# Patient Record
Sex: MALE | Race: WHITE | Employment: PART TIME | URBAN - METROPOLITAN AREA
[De-identification: names, ages, dates, MRNs, and addresses within clinical notes are randomized per-mention and may not be internally consistent; named-entity substitution may affect disease eponyms.]

---

## 2018-08-29 ENCOUNTER — OFFICE VISIT (OUTPATIENT)
Dept: ENT CLINIC | Age: 58
End: 2018-08-29
Payer: COMMERCIAL

## 2018-08-29 VITALS
BODY MASS INDEX: 21.19 KG/M2 | WEIGHT: 135 LBS | SYSTOLIC BLOOD PRESSURE: 125 MMHG | DIASTOLIC BLOOD PRESSURE: 87 MMHG | HEART RATE: 66 BPM | OXYGEN SATURATION: 97 % | HEIGHT: 67 IN

## 2018-08-29 DIAGNOSIS — H61.23 BILATERAL IMPACTED CERUMEN: Primary | ICD-10-CM

## 2018-08-29 DIAGNOSIS — H91.93 BILATERAL HEARING LOSS, UNSPECIFIED HEARING LOSS TYPE: ICD-10-CM

## 2018-08-29 PROCEDURE — 3017F COLORECTAL CA SCREEN DOC REV: CPT | Performed by: OTOLARYNGOLOGY

## 2018-08-29 PROCEDURE — 99204 OFFICE O/P NEW MOD 45 MIN: CPT | Performed by: OTOLARYNGOLOGY

## 2018-08-29 PROCEDURE — 1036F TOBACCO NON-USER: CPT | Performed by: OTOLARYNGOLOGY

## 2018-08-29 PROCEDURE — G8420 CALC BMI NORM PARAMETERS: HCPCS | Performed by: OTOLARYNGOLOGY

## 2018-08-29 PROCEDURE — G8427 DOCREV CUR MEDS BY ELIG CLIN: HCPCS | Performed by: OTOLARYNGOLOGY

## 2018-08-29 ASSESSMENT — ENCOUNTER SYMPTOMS
SINUS PRESSURE: 0
SORE THROAT: 0
RHINORRHEA: 0
SINUS PAIN: 0

## 2018-08-29 NOTE — PROGRESS NOTES
Smokeless tobacco: Never Used    Alcohol use No    Drug use: No    Sexual activity: No     Other Topics Concern    Not on file     Social History Narrative    No narrative on file       REVIEW OF SYSTEMS:                                                                                                       Review of Systems   HENT: Positive for hearing loss. Negative for congestion, drooling, ear discharge, ear pain, nosebleeds, postnasal drip, rhinorrhea, sinus pain, sinus pressure, sneezing, sore throat and tinnitus. Allergic/Immunologic: Negative for environmental allergies and food allergies. Constitutional: Negative for chills and fever. Eyes: Negative for discharge and itching. Respiratory: Negative for chest tightness and shortness of breath. Cardiovascular: Negative for chest pain and palpitations. Gastrointestinal: Negative for abdominal distention and abdominal pain. Endocrinology: Negative for heat and cold intolerance. Neurological: Negative for focal weakness or seizure   Skin: Negative for rash and itchiness   Psychiatric: Negative for depression and hallucinations     PHYSICAL EXAM:                                                                                                                /87   Pulse 66   Ht 5' 7\" (1.702 m)   Wt 135 lb (61.2 kg)   SpO2 97%   BMI 21.14 kg/m²   Physical Exam  Constitutional: Appears well-developed and well-nourished. Appears as stated age.    Eyes: Lids and lashes normal, pupils equal, extra ocular muscles intact, sclera clear   ENT: normocepalic, without obvious abnormality, atraumatic, sinuses nontender on palpation, significant cerumen impaction bilaterally, bilateral tympanic membrane(s) not visualized secondary cerumen, Nares normal, septum midline, no lesions, no drainage, lips normal, good dentition, gums normal, oral pharynx with moist mucus membranes, no lesions tongue, tonsils without erythema or exudates, palate within normal limits, normal voice   Neck:  Supple, symmetrical, trachea midline, no adenopathy, thyroid symmetric, not enlarged and no tenderness, skin normal   Respiratory: No increased work of breathing. No stridor or wheezes   Cardiovascular: Regular rate   Skin: Warm and dry   Neurologic:  Alert and oriented     Cerumen removal     Auditory canal(s) both ears completely obstructed with cerumen. A microscope was used due to deep impaction of the cerumen. Cerumen was gently removed using soft plastic curette, suction. Significant impaction was unable to be removed near TM bilaterally due to patient tolerance. Will give drops to loosen imaction and follow-up for completion of cerumen cleaning. ASSESSMENT AND PLAN:                                                                                                  Diagnosis Orders   1. Bilateral impacted cerumen     2. Bilateral hearing loss, unspecified hearing loss type         59 y.o. male with history of hearing loss bilaterally presents with cerumen impaction bilaterally    · Cerumen removal per above  · Home drops   · Follow as scheduled for completion of cerumen removal or sooner if symptoms acutely exacerbate. Will evaluation hearing at that time. Dr. Talya Woods.  Otolaryngology Facial Plastic Surgery  : [facility] Otolaryngology/Facial Plastic Surgery Residency    Associate Clinical Professor: SHRADDHA Nguyen  [facility]

## 2018-09-07 ENCOUNTER — OFFICE VISIT (OUTPATIENT)
Dept: ENT CLINIC | Age: 58
End: 2018-09-07
Payer: COMMERCIAL

## 2018-09-07 VITALS
HEIGHT: 67 IN | HEART RATE: 55 BPM | BODY MASS INDEX: 21.35 KG/M2 | DIASTOLIC BLOOD PRESSURE: 82 MMHG | WEIGHT: 136 LBS | SYSTOLIC BLOOD PRESSURE: 146 MMHG

## 2018-09-07 DIAGNOSIS — H61.23 BILATERAL HEARING LOSS DUE TO CERUMEN IMPACTION: Primary | ICD-10-CM

## 2018-09-07 PROCEDURE — 69210 REMOVE IMPACTED EAR WAX UNI: CPT | Performed by: OTOLARYNGOLOGY

## 2018-09-23 ASSESSMENT — ENCOUNTER SYMPTOMS
BLURRED VISION: 0
HEARTBURN: 0
VOMITING: 0
DOUBLE VISION: 0
COUGH: 0
SHORTNESS OF BREATH: 0

## 2018-09-23 NOTE — PROGRESS NOTES
dizziness, tingling and headaches. Endo/Heme/Allergies: Negative for environmental allergies. Does not bruise/bleed easily. All other systems reviewed and are negative. Procedure: Cerumen Impaction    Pre-op: Cerumen Impaction    Post Op: Same    Procedure: Cerumen Impaction removal    Surgeon: Emily Contreras    Procedure: Under microscopic assistance large cerumen impaction was removed using gentle suction and curette. TM intact B/L, Pt tolerated procedure well    Complications: None    Blood Loss Minimial        BP (!) 146/82 (Site: Right Upper Arm, Position: Sitting, Cuff Size: Medium Adult)   Pulse 55   Ht 5' 7\" (1.702 m)   Wt 136 lb (61.7 kg)   BMI 21.30 kg/m²   Physical Exam   Constitutional: He appears well-developed and well-nourished. HENT:   Ears:    Eyes: Pupils are equal, round, and reactive to light. EOM are normal.   Neck: Normal range of motion. No tracheal deviation present. No thyromegaly present. Cardiovascular: Normal rate and intact distal pulses. Pulmonary/Chest: Effort normal. No respiratory distress. Musculoskeletal: Normal range of motion. He exhibits no edema. Lymphadenopathy:     He has no cervical adenopathy. Neurological: He is alert. No cranial nerve deficit. Skin: Skin is warm. No erythema. Nursing note and vitals reviewed. IMPRESSION/PLAN:  Patient underwent cerumen partial removal bilaterally with microscopic assistance required instrumentation including curet and suction. Patient tolerated procedure well, here for baseline per the patient once completed, follow-up in 6 months or sooner with complaints. Dr. Sheila Palacio.  Otolaryngology Facial Plastic Surgery  :  [facility] Otolaryngology/Facial Plastic Surgery Residency  Associate Clinical Professor:  Luis Larios, [facility]

## 2018-10-03 ENCOUNTER — HOSPITAL ENCOUNTER (EMERGENCY)
Age: 58
Discharge: HOME | End: 2018-10-03
Payer: COMMERCIAL

## 2018-10-03 VITALS
DIASTOLIC BLOOD PRESSURE: 66 MMHG | HEART RATE: 64 BPM | SYSTOLIC BLOOD PRESSURE: 148 MMHG | BODY MASS INDEX: 21.68 KG/M2 | TEMPERATURE: 97.8 F | WEIGHT: 138.13 LBS | HEIGHT: 67 IN | OXYGEN SATURATION: 98 % | RESPIRATION RATE: 14 BRPM

## 2018-10-03 DIAGNOSIS — M25.562 CHRONIC PAIN OF LEFT KNEE: Primary | ICD-10-CM

## 2018-10-03 DIAGNOSIS — G89.29 CHRONIC PAIN OF LEFT KNEE: Primary | ICD-10-CM

## 2018-10-03 PROCEDURE — 99283 EMERGENCY DEPT VISIT LOW MDM: CPT

## 2018-10-03 RX ORDER — MELOXICAM 7.5 MG/1
7.5 TABLET ORAL DAILY
Qty: 10 TABLET | Refills: 0 | Status: SHIPPED | OUTPATIENT
Start: 2018-10-03 | End: 2018-10-22 | Stop reason: ALTCHOICE

## 2018-10-03 ASSESSMENT — PAIN DESCRIPTION - ORIENTATION: ORIENTATION: LEFT

## 2018-10-03 ASSESSMENT — PAIN DESCRIPTION - PAIN TYPE: TYPE: ACUTE PAIN

## 2018-10-03 ASSESSMENT — PAIN DESCRIPTION - LOCATION: LOCATION: KNEE

## 2018-10-03 ASSESSMENT — PAIN DESCRIPTION - DESCRIPTORS: DESCRIPTORS: ACHING;DISCOMFORT

## 2018-10-03 ASSESSMENT — PAIN SCALES - GENERAL: PAINLEVEL_OUTOF10: 8
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
## 2018-10-22 ENCOUNTER — OFFICE VISIT (OUTPATIENT)
Dept: FAMILY MEDICINE CLINIC | Age: 58
End: 2018-10-22
Payer: COMMERCIAL

## 2018-10-22 VITALS
OXYGEN SATURATION: 97 % | TEMPERATURE: 97.9 F | HEIGHT: 67 IN | WEIGHT: 139.8 LBS | HEART RATE: 68 BPM | DIASTOLIC BLOOD PRESSURE: 83 MMHG | RESPIRATION RATE: 16 BRPM | BODY MASS INDEX: 21.94 KG/M2 | SYSTOLIC BLOOD PRESSURE: 137 MMHG

## 2018-10-22 DIAGNOSIS — Z76.89 ENCOUNTER TO ESTABLISH CARE: Primary | ICD-10-CM

## 2018-10-22 DIAGNOSIS — Z13.31 DEPRESSION SCREENING NEGATIVE: ICD-10-CM

## 2018-10-22 DIAGNOSIS — M25.562 CHRONIC PAIN OF LEFT KNEE: ICD-10-CM

## 2018-10-22 DIAGNOSIS — G89.29 CHRONIC PAIN OF LEFT KNEE: ICD-10-CM

## 2018-10-22 PROCEDURE — 3017F COLORECTAL CA SCREEN DOC REV: CPT | Performed by: FAMILY MEDICINE

## 2018-10-22 PROCEDURE — 36415 COLL VENOUS BLD VENIPUNCTURE: CPT | Performed by: FAMILY MEDICINE

## 2018-10-22 PROCEDURE — 1036F TOBACCO NON-USER: CPT | Performed by: FAMILY MEDICINE

## 2018-10-22 PROCEDURE — G8427 DOCREV CUR MEDS BY ELIG CLIN: HCPCS | Performed by: FAMILY MEDICINE

## 2018-10-22 PROCEDURE — 99203 OFFICE O/P NEW LOW 30 MIN: CPT | Performed by: FAMILY MEDICINE

## 2018-10-22 PROCEDURE — G8484 FLU IMMUNIZE NO ADMIN: HCPCS | Performed by: FAMILY MEDICINE

## 2018-10-22 PROCEDURE — G8420 CALC BMI NORM PARAMETERS: HCPCS | Performed by: FAMILY MEDICINE

## 2018-10-22 RX ORDER — NAPROXEN 250 MG/1
250 TABLET ORAL 2 TIMES DAILY WITH MEALS
Qty: 60 TABLET | Refills: 0 | Status: SHIPPED | OUTPATIENT
Start: 2018-10-22 | End: 2018-11-28 | Stop reason: ALTCHOICE

## 2018-10-22 ASSESSMENT — PATIENT HEALTH QUESTIONNAIRE - PHQ9
1. LITTLE INTEREST OR PLEASURE IN DOING THINGS: 0
SUM OF ALL RESPONSES TO PHQ QUESTIONS 1-9: 0
2. FEELING DOWN, DEPRESSED OR HOPELESS: 0
SUM OF ALL RESPONSES TO PHQ QUESTIONS 1-9: 0
SUM OF ALL RESPONSES TO PHQ9 QUESTIONS 1 & 2: 0
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
## 2018-10-25 DIAGNOSIS — M25.562 CHRONIC PAIN OF LEFT KNEE: Primary | ICD-10-CM

## 2018-10-25 DIAGNOSIS — G89.29 CHRONIC PAIN OF LEFT KNEE: Primary | ICD-10-CM

## 2018-10-26 ENCOUNTER — OFFICE VISIT (OUTPATIENT)
Dept: ORTHOPEDIC SURGERY | Age: 58
End: 2018-10-26
Payer: COMMERCIAL

## 2018-10-26 VITALS — TEMPERATURE: 98 F | HEIGHT: 67 IN | BODY MASS INDEX: 21.19 KG/M2 | WEIGHT: 135 LBS

## 2018-10-26 DIAGNOSIS — S83.242A TEAR OF MEDIAL MENISCUS OF LEFT KNEE, CURRENT, UNSPECIFIED TEAR TYPE, INITIAL ENCOUNTER: Primary | ICD-10-CM

## 2018-10-26 DIAGNOSIS — M17.12 PRIMARY OSTEOARTHRITIS OF LEFT KNEE: ICD-10-CM

## 2018-10-26 PROCEDURE — 99203 OFFICE O/P NEW LOW 30 MIN: CPT | Performed by: ORTHOPAEDIC SURGERY

## 2018-10-26 PROCEDURE — G8484 FLU IMMUNIZE NO ADMIN: HCPCS | Performed by: ORTHOPAEDIC SURGERY

## 2018-10-26 PROCEDURE — 3017F COLORECTAL CA SCREEN DOC REV: CPT | Performed by: ORTHOPAEDIC SURGERY

## 2018-10-26 PROCEDURE — G8420 CALC BMI NORM PARAMETERS: HCPCS | Performed by: ORTHOPAEDIC SURGERY

## 2018-10-26 PROCEDURE — G8427 DOCREV CUR MEDS BY ELIG CLIN: HCPCS | Performed by: ORTHOPAEDIC SURGERY

## 2018-10-26 PROCEDURE — 1036F TOBACCO NON-USER: CPT | Performed by: ORTHOPAEDIC SURGERY

## 2018-11-12 ENCOUNTER — HOSPITAL ENCOUNTER (OUTPATIENT)
Dept: MRI IMAGING | Age: 58
Discharge: HOME | End: 2018-11-14
Payer: COMMERCIAL

## 2018-11-12 DIAGNOSIS — S83.242A TEAR OF MEDIAL MENISCUS OF LEFT KNEE, CURRENT, UNSPECIFIED TEAR TYPE, INITIAL ENCOUNTER: ICD-10-CM

## 2018-11-12 DIAGNOSIS — M17.12 PRIMARY OSTEOARTHRITIS OF LEFT KNEE: ICD-10-CM

## 2018-11-12 PROCEDURE — 73721 MRI JNT OF LWR EXTRE W/O DYE: CPT

## 2018-11-28 ENCOUNTER — OFFICE VISIT (OUTPATIENT)
Dept: ORTHOPEDIC SURGERY | Age: 58
End: 2018-11-28
Payer: COMMERCIAL

## 2018-11-28 VITALS — TEMPERATURE: 98 F | WEIGHT: 135 LBS | BODY MASS INDEX: 21.19 KG/M2 | HEIGHT: 67 IN

## 2018-11-28 DIAGNOSIS — M17.12 PRIMARY OSTEOARTHRITIS OF LEFT KNEE: ICD-10-CM

## 2018-11-28 DIAGNOSIS — M22.2X2 PATELLOFEMORAL DISORDER OF LEFT KNEE: Primary | ICD-10-CM

## 2018-11-28 PROCEDURE — 97760 ORTHOTIC MGMT&TRAING 1ST ENC: CPT | Performed by: ORTHOPAEDIC SURGERY

## 2018-11-28 PROCEDURE — G8420 CALC BMI NORM PARAMETERS: HCPCS | Performed by: ORTHOPAEDIC SURGERY

## 2018-11-28 PROCEDURE — G8484 FLU IMMUNIZE NO ADMIN: HCPCS | Performed by: ORTHOPAEDIC SURGERY

## 2018-11-28 PROCEDURE — 1036F TOBACCO NON-USER: CPT | Performed by: ORTHOPAEDIC SURGERY

## 2018-11-28 PROCEDURE — 3017F COLORECTAL CA SCREEN DOC REV: CPT | Performed by: ORTHOPAEDIC SURGERY

## 2018-11-28 PROCEDURE — 99214 OFFICE O/P EST MOD 30 MIN: CPT | Performed by: ORTHOPAEDIC SURGERY

## 2018-11-28 PROCEDURE — G8427 DOCREV CUR MEDS BY ELIG CLIN: HCPCS | Performed by: ORTHOPAEDIC SURGERY

## 2018-11-28 RX ORDER — MELOXICAM 15 MG/1
15 TABLET ORAL DAILY
Qty: 30 TABLET | Refills: 3 | Status: SHIPPED | OUTPATIENT
Start: 2018-11-28 | End: 2019-10-16

## 2018-11-28 RX ORDER — OMEPRAZOLE 20 MG/1
20 CAPSULE, DELAYED RELEASE ORAL 2 TIMES DAILY
Qty: 30 CAPSULE | Refills: 3 | Status: SHIPPED | OUTPATIENT
Start: 2018-11-28 | End: 2019-10-16

## 2018-12-04 ENCOUNTER — OFFICE VISIT (OUTPATIENT)
Dept: ORTHOPEDIC SURGERY | Age: 58
End: 2018-12-04
Payer: COMMERCIAL

## 2018-12-04 VITALS — WEIGHT: 135 LBS | HEIGHT: 67 IN | TEMPERATURE: 98 F | BODY MASS INDEX: 21.19 KG/M2

## 2018-12-04 DIAGNOSIS — M17.12 PRIMARY OSTEOARTHRITIS OF LEFT KNEE: Primary | ICD-10-CM

## 2018-12-04 PROCEDURE — 20610 DRAIN/INJ JOINT/BURSA W/O US: CPT | Performed by: ORTHOPAEDIC SURGERY

## 2018-12-04 PROCEDURE — 1036F TOBACCO NON-USER: CPT | Performed by: ORTHOPAEDIC SURGERY

## 2018-12-04 PROCEDURE — G8427 DOCREV CUR MEDS BY ELIG CLIN: HCPCS | Performed by: ORTHOPAEDIC SURGERY

## 2018-12-04 PROCEDURE — 99213 OFFICE O/P EST LOW 20 MIN: CPT | Performed by: ORTHOPAEDIC SURGERY

## 2018-12-04 PROCEDURE — G8484 FLU IMMUNIZE NO ADMIN: HCPCS | Performed by: ORTHOPAEDIC SURGERY

## 2018-12-04 PROCEDURE — G8420 CALC BMI NORM PARAMETERS: HCPCS | Performed by: ORTHOPAEDIC SURGERY

## 2018-12-04 PROCEDURE — 3017F COLORECTAL CA SCREEN DOC REV: CPT | Performed by: ORTHOPAEDIC SURGERY

## 2018-12-04 RX ORDER — TRIAMCINOLONE ACETONIDE 40 MG/ML
40 INJECTION, SUSPENSION INTRA-ARTICULAR; INTRAMUSCULAR ONCE
Status: COMPLETED | OUTPATIENT
Start: 2018-12-04 | End: 2018-12-04

## 2018-12-04 RX ADMIN — TRIAMCINOLONE ACETONIDE 40 MG: 40 INJECTION, SUSPENSION INTRA-ARTICULAR; INTRAMUSCULAR at 13:37

## 2019-01-02 ENCOUNTER — OFFICE VISIT (OUTPATIENT)
Dept: ORTHOPEDIC SURGERY | Age: 59
End: 2019-01-02
Payer: COMMERCIAL

## 2019-01-02 VITALS — HEIGHT: 67 IN | WEIGHT: 140 LBS | BODY MASS INDEX: 21.97 KG/M2 | TEMPERATURE: 98 F

## 2019-01-02 DIAGNOSIS — M17.12 PRIMARY OSTEOARTHRITIS OF LEFT KNEE: Primary | ICD-10-CM

## 2019-01-02 PROCEDURE — G8427 DOCREV CUR MEDS BY ELIG CLIN: HCPCS | Performed by: ORTHOPAEDIC SURGERY

## 2019-01-02 PROCEDURE — 99214 OFFICE O/P EST MOD 30 MIN: CPT | Performed by: ORTHOPAEDIC SURGERY

## 2019-01-02 PROCEDURE — 1036F TOBACCO NON-USER: CPT | Performed by: ORTHOPAEDIC SURGERY

## 2019-01-02 PROCEDURE — G8420 CALC BMI NORM PARAMETERS: HCPCS | Performed by: ORTHOPAEDIC SURGERY

## 2019-01-02 PROCEDURE — G8484 FLU IMMUNIZE NO ADMIN: HCPCS | Performed by: ORTHOPAEDIC SURGERY

## 2019-01-02 PROCEDURE — 3017F COLORECTAL CA SCREEN DOC REV: CPT | Performed by: ORTHOPAEDIC SURGERY

## 2019-10-16 ENCOUNTER — OFFICE VISIT (OUTPATIENT)
Dept: ORTHOPEDIC SURGERY | Age: 59
End: 2019-10-16
Payer: COMMERCIAL

## 2019-10-16 VITALS — BODY MASS INDEX: 21.97 KG/M2 | HEIGHT: 67 IN | WEIGHT: 140 LBS

## 2019-10-16 DIAGNOSIS — S83.242A TEAR OF MEDIAL MENISCUS OF LEFT KNEE, CURRENT, UNSPECIFIED TEAR TYPE, INITIAL ENCOUNTER: ICD-10-CM

## 2019-10-16 DIAGNOSIS — M17.12 PRIMARY OSTEOARTHRITIS OF LEFT KNEE: Primary | ICD-10-CM

## 2019-10-16 PROCEDURE — 1036F TOBACCO NON-USER: CPT | Performed by: ORTHOPAEDIC SURGERY

## 2019-10-16 PROCEDURE — 99214 OFFICE O/P EST MOD 30 MIN: CPT | Performed by: ORTHOPAEDIC SURGERY

## 2019-10-16 PROCEDURE — G8484 FLU IMMUNIZE NO ADMIN: HCPCS | Performed by: ORTHOPAEDIC SURGERY

## 2019-10-16 PROCEDURE — G8427 DOCREV CUR MEDS BY ELIG CLIN: HCPCS | Performed by: ORTHOPAEDIC SURGERY

## 2019-10-16 PROCEDURE — 3017F COLORECTAL CA SCREEN DOC REV: CPT | Performed by: ORTHOPAEDIC SURGERY

## 2019-10-16 PROCEDURE — G8420 CALC BMI NORM PARAMETERS: HCPCS | Performed by: ORTHOPAEDIC SURGERY

## 2020-06-12 ENCOUNTER — OFFICE VISIT (OUTPATIENT)
Dept: ORTHOPEDIC SURGERY | Age: 60
End: 2020-06-12
Payer: COMMERCIAL

## 2020-06-12 VITALS — WEIGHT: 140 LBS | BODY MASS INDEX: 21.97 KG/M2 | HEIGHT: 67 IN

## 2020-06-12 PROCEDURE — 1036F TOBACCO NON-USER: CPT | Performed by: ORTHOPAEDIC SURGERY

## 2020-06-12 PROCEDURE — G8427 DOCREV CUR MEDS BY ELIG CLIN: HCPCS | Performed by: ORTHOPAEDIC SURGERY

## 2020-06-12 PROCEDURE — 3017F COLORECTAL CA SCREEN DOC REV: CPT | Performed by: ORTHOPAEDIC SURGERY

## 2020-06-12 PROCEDURE — 99214 OFFICE O/P EST MOD 30 MIN: CPT | Performed by: ORTHOPAEDIC SURGERY

## 2020-06-12 PROCEDURE — G8420 CALC BMI NORM PARAMETERS: HCPCS | Performed by: ORTHOPAEDIC SURGERY

## 2020-06-12 RX ORDER — TERBINAFINE HYDROCHLORIDE 250 MG/1
TABLET ORAL
COMMUNITY
Start: 2020-05-30 | End: 2021-02-05
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
## 2020-07-02 ENCOUNTER — OFFICE VISIT (OUTPATIENT)
Dept: ORTHOPEDIC SURGERY | Age: 60
End: 2020-07-02
Payer: COMMERCIAL

## 2020-07-02 PROCEDURE — 20610 DRAIN/INJ JOINT/BURSA W/O US: CPT | Performed by: ORTHOPAEDIC SURGERY

## 2020-07-02 NOTE — PROGRESS NOTES
Chief Complaint   Patient presents with    Injections         The following is a well known patient to me that is here for Left injection. This is Supartz number 1. The left knee was prepped in a sterile fashion. Supartz 2.5 ml was injected into the Left knee. The patient tolerated the injection well. I will see the patient back in 1 week. Darnell Prasad was seen today for injections.     Diagnoses and all orders for this visit:    Primary osteoarthritis of left knee  -     CT ARTHROCENTESIS ASPIR&/INJ MAJOR JT/BURSA W/O US    Other orders  -     sodium hyaluronate (SUPARTZ) injection 25 mg

## 2020-07-09 ENCOUNTER — OFFICE VISIT (OUTPATIENT)
Dept: ORTHOPEDIC SURGERY | Age: 60
End: 2020-07-09
Payer: COMMERCIAL

## 2020-07-09 PROCEDURE — 20610 DRAIN/INJ JOINT/BURSA W/O US: CPT | Performed by: ORTHOPAEDIC SURGERY

## 2020-07-09 NOTE — PROGRESS NOTES
Chief Complaint   Patient presents with    Injections         The following is a well known patient to me that is here for Left injection. This is Supartz number 1. The left knee was prepped in a sterile fashion. Supartz 2.5 ml was injected into the Left knee. The patient tolerated the injection well. I will see the patient back in 1 week. Claudette Davisavan was seen today for injections.     Diagnoses and all orders for this visit:    Primary osteoarthritis of left knee  -     [place] ARTHROCENTESIS ASPIR&/INJ MAJOR JT/BURSA W/O US    Other orders  -     sodium hyaluronate (SUPARTZ) injection 25 mg

## 2020-07-16 ENCOUNTER — OFFICE VISIT (OUTPATIENT)
Dept: ORTHOPEDIC SURGERY | Age: 60
End: 2020-07-16
Payer: COMMERCIAL

## 2020-07-16 PROCEDURE — 20610 DRAIN/INJ JOINT/BURSA W/O US: CPT | Performed by: ORTHOPAEDIC SURGERY

## 2020-07-16 NOTE — PROGRESS NOTES
Chief Complaint   Patient presents with    Injections         The following is a well known patient to me that is here for Left injection. This is Supartz number 3. The left knee was prepped in a sterile fashion. Supartz 2.5 ml was injected into the Left knee. The patient tolerated the injection well. I will see the patient back in 1 week. Kenyatta Carrion was seen today for injections.     Diagnoses and all orders for this visit:    Primary osteoarthritis of left knee  -     [place] ARTHROCENTESIS ASPIR&/INJ MAJOR JT/BURSA W/O US    Other orders  -     sodium hyaluronate (SUPARTZ) injection 25 mg

## 2020-07-21 ENCOUNTER — OFFICE VISIT (OUTPATIENT)
Dept: ENT CLINIC | Age: 60
End: 2020-07-21
Payer: COMMERCIAL

## 2020-07-21 VITALS
WEIGHT: 136 LBS | DIASTOLIC BLOOD PRESSURE: 64 MMHG | SYSTOLIC BLOOD PRESSURE: 112 MMHG | BODY MASS INDEX: 21.35 KG/M2 | HEIGHT: 67 IN

## 2020-07-21 PROCEDURE — G8420 CALC BMI NORM PARAMETERS: HCPCS | Performed by: OTOLARYNGOLOGY

## 2020-07-21 PROCEDURE — 1036F TOBACCO NON-USER: CPT | Performed by: OTOLARYNGOLOGY

## 2020-07-21 PROCEDURE — G8427 DOCREV CUR MEDS BY ELIG CLIN: HCPCS | Performed by: OTOLARYNGOLOGY

## 2020-07-21 PROCEDURE — 99213 OFFICE O/P EST LOW 20 MIN: CPT | Performed by: OTOLARYNGOLOGY

## 2020-07-21 PROCEDURE — 3017F COLORECTAL CA SCREEN DOC REV: CPT | Performed by: OTOLARYNGOLOGY

## 2020-07-21 RX ORDER — CIPROFLOXACIN HYDROCHLORIDE 3.5 MG/ML
SOLUTION/ DROPS TOPICAL
Qty: 60 DROP | Refills: 0 | Status: SHIPPED
Start: 2020-07-21 | End: 2021-02-05

## 2020-07-21 ASSESSMENT — ENCOUNTER SYMPTOMS
HEARTBURN: 0
SHORTNESS OF BREATH: 0
BLURRED VISION: 0
DOUBLE VISION: 0
VOMITING: 0
COUGH: 0

## 2020-07-21 NOTE — PROGRESS NOTES
[facility] Otolaryngology  Dr. Josiah Higginbotham. Ms.Ed        Patient Name:  Moraima Vance  :  1960     CHIEF C/O:    Chief Complaint   Patient presents with    Cerumen Impaction     4 month cerumen patient states that he has been having problems with the right ear.  having some pain    Ear Problem       HISTORY OBTAINED FROM:  patient    HISTORY OF PRESENT ILLNESS:       Jani Landry is a 62y.o. year old male, here today for right ear pain. Also complaining of fullness, decreased hearing and ringing in ears. Started 1 month ago. Reports using cotton balls as ear plugs to sleep. Denies drainage, denies fevers    Past Medical History:   Diagnosis Date    Hemorrhoids     Hernia     small watching it     Past Surgical History:   Procedure Laterality Date    COLON SURGERY      HEMORRHOID SURGERY      OTHER SURGICAL HISTORY  3/13/2012    Hemorrhoidectomy. Dr. Padma Polk         Current Outpatient Medications:     ciprofloxacin (CILOXAN) 0.3 % ophthalmic solution, 4 gtts right ear BID for 10 days, Disp: 60 drop, Rfl: 0    terbinafine (LAMISIL) 250 MG tablet, take 1 tablet by mouth daily, Disp: , Rfl:   Patient has no known allergies. Social History     Tobacco Use    Smoking status: Former Smoker     Types: Cigarettes     Last attempt to quit: 2004     Years since quitting: 15.9    Smokeless tobacco: Never Used   Substance Use Topics    Alcohol use: No     Alcohol/week: 0.0 standard drinks    Drug use: No     Family History   Problem Relation Age of Onset    Heart Disease Mother     No Known Problems Father     No Known Problems Sister     No Known Problems Brother        Review of Systems   Constitutional: Negative for chills and fever. HENT: Positive for ear pain, hearing loss and tinnitus. Negative for ear discharge. Eyes: Negative for blurred vision and double vision. Respiratory: Negative for cough and shortness of breath.     Cardiovascular: Negative for Recommend not putting anything in ears until next visit. In future may use silicone ear plugs. Will see patient for follow up in 2 weeks or sooner if any acute issues or concerns arise. Bro Cates  1960      I have discussed the case, including pertinent history and exam findings with the resident. I have seen and examined the patient and the key elements of the encounter have been performed by me. I agree with the assessment, plan and orders as documented by the resident. Patient here for follow up of medical problems. Remainder of medical problems as per resident note.       [address] [facility], DO  8/4/20

## 2020-07-23 ENCOUNTER — OFFICE VISIT (OUTPATIENT)
Dept: ORTHOPEDIC SURGERY | Age: 60
End: 2020-07-23
Payer: COMMERCIAL

## 2020-07-23 PROCEDURE — 20610 DRAIN/INJ JOINT/BURSA W/O US: CPT | Performed by: ORTHOPAEDIC SURGERY

## 2020-07-23 NOTE — PROGRESS NOTES
Chief Complaint   Patient presents with    Knee Pain     Left knee supartz #4         The following is a well known patient to me that is here for Left injection. This is Supartz number 4. The left knee was prepped in a sterile fashion. Supartz 2.5 ml was injected into the Left knee. The patient tolerated the injection well. I will see the patient back in 1 week. Sergio Kashif was seen today for knee pain.     Diagnoses and all orders for this visit:    Primary osteoarthritis of left knee  -     [place] ARTHROCENTESIS ASPIR&/INJ MAJOR JT/BURSA W/O US    Other orders  -     sodium hyaluronate (SUPARTZ) injection 25 mg

## 2020-07-30 ENCOUNTER — OFFICE VISIT (OUTPATIENT)
Dept: ORTHOPEDIC SURGERY | Age: 60
End: 2020-07-30
Payer: COMMERCIAL

## 2020-07-30 PROCEDURE — 20610 DRAIN/INJ JOINT/BURSA W/O US: CPT | Performed by: ORTHOPAEDIC SURGERY

## 2020-07-30 NOTE — PROGRESS NOTES
Chief Complaint   Patient presents with    Injections         The following is a well known patient to me that is here for Left injection. This is Supartz number 5. The left knee was prepped in a sterile fashion. Supartz 2.5 ml was injected into the Left knee. The patient tolerated the injection well. I will see the patient back in 4 week prn. Lillian Khan was seen today for injections.     Diagnoses and all orders for this visit:    Primary osteoarthritis of left knee  -     [place] ARTHROCENTESIS ASPIR&/INJ MAJOR JT/BURSA W/O US    Other orders  -     sodium hyaluronate (SUPARTZ) injection 25 mg

## 2020-08-04 ENCOUNTER — PROCEDURE VISIT (OUTPATIENT)
Dept: AUDIOLOGY | Age: 60
End: 2020-08-04
Payer: COMMERCIAL

## 2020-08-04 ENCOUNTER — OFFICE VISIT (OUTPATIENT)
Dept: ENT CLINIC | Age: 60
End: 2020-08-04
Payer: COMMERCIAL

## 2020-08-04 VITALS — BODY MASS INDEX: 20.88 KG/M2 | HEIGHT: 67 IN | TEMPERATURE: 98 F | WEIGHT: 133 LBS

## 2020-08-04 PROCEDURE — 99213 OFFICE O/P EST LOW 20 MIN: CPT | Performed by: OTOLARYNGOLOGY

## 2020-08-04 PROCEDURE — G8427 DOCREV CUR MEDS BY ELIG CLIN: HCPCS | Performed by: OTOLARYNGOLOGY

## 2020-08-04 PROCEDURE — 92557 COMPREHENSIVE HEARING TEST: CPT | Performed by: AUDIOLOGIST

## 2020-08-04 PROCEDURE — 3017F COLORECTAL CA SCREEN DOC REV: CPT | Performed by: OTOLARYNGOLOGY

## 2020-08-04 PROCEDURE — G8420 CALC BMI NORM PARAMETERS: HCPCS | Performed by: OTOLARYNGOLOGY

## 2020-08-04 PROCEDURE — 69210 REMOVE IMPACTED EAR WAX UNI: CPT | Performed by: OTOLARYNGOLOGY

## 2020-08-04 PROCEDURE — 92567 TYMPANOMETRY: CPT | Performed by: AUDIOLOGIST

## 2020-08-04 PROCEDURE — 1036F TOBACCO NON-USER: CPT | Performed by: OTOLARYNGOLOGY

## 2020-08-05 NOTE — PROGRESS NOTES
This patient was referred for audiometric/tympanometric testing by Dr. Lucinda Pierre due to bilateral ear fullness and tinnitus. Patient denied hearing loss and vertigo, at this time. Audiometry revealed normal hearing sensitivity, at 250-2000Hz, sloping to a mild sensorineural hearing loss, at 3000-8000Hz, bilaterally. Reliability was good. Speech reception thresholds were in good agreement with the pure tone averages, bilaterally. Speech discrimination scores were 100%, bilaterally at 45-50dBHL. Tympanometry revealed normal middle ear peak pressure and compliance, bilaterally. Ipsilateral acoustic reflexes were absent, right ear and present, left ear at 1000Hz. The results were reviewed with the patient. Recommendations for follow up will be made pending physician consult.     [facility], [address] [place] [address]

## 2020-08-25 ASSESSMENT — ENCOUNTER SYMPTOMS
VOMITING: 0
COUGH: 0
SHORTNESS OF BREATH: 0

## 2020-08-25 NOTE — PROGRESS NOTES
[facility]  Dr. Elizabethann Se. Robynn Olszewski. Ms.Ed        Patient Name:  Willem River  :  1960     CHIEF C/O:    Chief Complaint   Patient presents with    Cerumen Impaction     patient states that both ears feel plugged    Ear Problem       HISTORY OBTAINED FROM:  patient    HISTORY OF PRESENT ILLNESS:       Samina Morton is a 62y.o. year old male, here today for follow up of history of otitis externa here for follow-up. Patient said she still having some bilateral ear fullness without associated ear drainage. No current complaints of vertigo or tinnitus. No current complaints of fever chills. Complete medical therapy at this point, no significant changes. Past Medical History:   Diagnosis Date    Hemorrhoids     Hernia     small watching it     Past Surgical History:   Procedure Laterality Date    COLON SURGERY      HEMORRHOID SURGERY      OTHER SURGICAL HISTORY  3/13/2012    Hemorrhoidectomy. Dr. Peggy Acevedo         Current Outpatient Medications:     ciprofloxacin (CILOXAN) 0.3 % ophthalmic solution, 4 gtts right ear BID for 10 days, Disp: 60 drop, Rfl: 0    terbinafine (LAMISIL) 250 MG tablet, take 1 tablet by mouth daily, Disp: , Rfl:   Patient has no known allergies. Social History     Tobacco Use    Smoking status: Former Smoker     Types: Cigarettes     Last attempt to quit: 2004     Years since quittin.0    Smokeless tobacco: Never Used   Substance Use Topics    Alcohol use: No     Alcohol/week: 0.0 standard drinks    Drug use: No     Family History   Problem Relation Age of Onset    Heart Disease Mother     No Known Problems Father     No Known Problems Sister     No Known Problems Brother        Review of Systems   Constitutional: Negative for chills and fever. HENT: Positive for ear discharge, ear pain and hearing loss. Respiratory: Negative for cough and shortness of breath.     Cardiovascular: Negative for chest pain and palpitations. Gastrointestinal: Negative for vomiting. Skin: Negative for rash. Allergic/Immunologic: Negative for environmental allergies. Neurological: Negative for dizziness and headaches. Hematological: Does not bruise/bleed easily. All other systems reviewed and are negative. Temp 98 °F (36.7 °C)   Ht 5' 7\" (1.702 m)   Wt 133 lb (60.3 kg)   BMI 20.83 kg/m²   Physical Exam  Vitals signs and nursing note reviewed. Constitutional:       Appearance: He is well-developed. HENT:      Head: Normocephalic and atraumatic. Right Ear: Ear canal and external ear normal.      Left Ear: Ear canal and external ear normal.      Ears:      Comments: Cerumen impaction bilaterally     Nose: Congestion present. No rhinorrhea. Mouth/Throat:      Mouth: Mucous membranes are dry. Pharynx: Oropharynx is clear. No oropharyngeal exudate or posterior oropharyngeal erythema. Eyes:      Conjunctiva/sclera: Conjunctivae normal.      Pupils: Pupils are equal, round, and reactive to light. Neck:      Musculoskeletal: Normal range of motion and neck supple. Cardiovascular:      Rate and Rhythm: Normal rate. Pulmonary:      Effort: Pulmonary effort is normal. No respiratory distress. Breath sounds: Normal breath sounds. Abdominal:      General: There is no distension. Tenderness: There is no abdominal tenderness. There is no guarding. Musculoskeletal: Normal range of motion. Skin:     General: Skin is warm and dry. Neurological:      Mental Status: He is alert and oriented to person, place, and time. Psychiatric:         Behavior: Behavior normal.         Thought Content:  Thought content normal.         Judgment: Judgment normal.             Procedure: Cerumen Impaction    Pre-op: Cerumen Impaction    Post Op: Same    Procedure: Cerumen Impaction removal    Surgeon: Gustavo Tao    Procedure: Under microscopic assistance large cerumen impaction was removed using gentle suction and curette. TM intact B/L, Pt tolerated procedure well    Complications: None    Blood Loss Minimial        IMPRESSION/PLAN:  Patient is seen and examined for known history of recurrent otitis externa, underwent bilateral cerumen debridement as well as an audiogram today showing normal tympanograms, with only mild upper limit sensorineural hearing loss. Continue water precautions, and follow-up with otolaryngology in 4 to 6 months or sooner with any increasing concerns of ear fullness pain or pressure. Dr. Yassine Reed.  Otolaryngology Facial Plastic Surgery  :  [facility] Otolaryngology/Facial Plastic Surgery Residency  Associate Clinical Professor:  [place], [employer]  [facility]

## 2020-11-12 ENCOUNTER — OFFICE VISIT (OUTPATIENT)
Dept: ORTHOPEDIC SURGERY | Age: 60
End: 2020-11-12
Payer: COMMERCIAL

## 2020-11-12 VITALS — TEMPERATURE: 98 F | HEIGHT: 67 IN | WEIGHT: 133 LBS | BODY MASS INDEX: 20.88 KG/M2

## 2020-11-12 PROCEDURE — G8484 FLU IMMUNIZE NO ADMIN: HCPCS | Performed by: ORTHOPAEDIC SURGERY

## 2020-11-12 PROCEDURE — G8420 CALC BMI NORM PARAMETERS: HCPCS | Performed by: ORTHOPAEDIC SURGERY

## 2020-11-12 PROCEDURE — G8427 DOCREV CUR MEDS BY ELIG CLIN: HCPCS | Performed by: ORTHOPAEDIC SURGERY

## 2020-11-12 PROCEDURE — 3017F COLORECTAL CA SCREEN DOC REV: CPT | Performed by: ORTHOPAEDIC SURGERY

## 2020-11-12 PROCEDURE — 99214 OFFICE O/P EST MOD 30 MIN: CPT | Performed by: ORTHOPAEDIC SURGERY

## 2020-11-12 PROCEDURE — 1036F TOBACCO NON-USER: CPT | Performed by: ORTHOPAEDIC SURGERY

## 2020-11-12 NOTE — PATIENT INSTRUCTIONS
Patient Education        Meniscus Tear: Care Instructions  Your Care Instructions     The meniscus is rubbery tissue in the knee that acts as a shock absorber between the upper and lower leg bones. The meniscus also keeps your knee stable by spreading weight across it. Each knee has two menisci (plural of meniscus). You can tear a meniscus if you plant your foot and twist, or pivot. The meniscus also can wear down as you age, and it can tear from squatting or kneeling. Small tears may heal on their own with rest and some physical therapy. But a more serious tear may need surgery to repair it or to remove part of the meniscus. Your doctor may want you to see a doctor who specializes in bones and sports injuries. Follow-up care is a key part of your treatment and safety. Be sure to make and go to all appointments, and call your doctor if you are having problems. It's also a good idea to know your test results and keep a list of the medicines you take. How can you care for yourself at home? · Rest your knee when possible. · Do not squat or kneel. · Take pain medicines exactly as directed. ? If the doctor gave you a prescription medicine for pain, take it as prescribed. ? If you are not taking a prescription pain medicine, ask your doctor if you can take an over-the-counter medicine. · Put ice or a cold pack on your knee for 10 to 20 minutes at a time. Try to do this every 1 to 2 hours for the next 3 days (when you are awake) or until the swelling goes down. Put a thin cloth between the ice and your skin. · Prop up the sore leg on a pillow when you ice your knee or any time you sit or lie down during the next 3 days. Try to keep your leg above the level of your heart. This will help reduce swelling. · Follow your doctor's directions for using crutches or a knee brace, if suggested. · Follow your doctor's directions for exercises to keep your knee mobile and your leg muscles strong.  Here are a few exercises you can try if your doctor says it is okay. ? Quad sets: Lie down on the floor or the bed with your injured leg straight. Fully extend your leg--there should be no or little bend in your knee. Tighten the thigh (quadriceps) of your injured leg for 6 seconds. Do not lift your heel up. Relax your quadriceps for 10 seconds. Repeat this exercise 8 to 12 times several times during the day. ? Straight-leg raises: Lie down on the floor or the bed with your injured leg flat and your uninjured leg bent so that the bottom of your foot is on the floor or bed. Tighten the quadriceps of your injured leg. Keeping your knee as straight as possible, lift your injured leg off the bed until it is about 18 inches above the bed or floor. Lower your leg back down and relax for 5 seconds. Do 3 sets of 20 repetitions, or if you tire quickly, 3 sets of 8 to 12 repetitions. ? Heel raises: Stand with your feet a few inches apart. Rest your hands lightly on a counter or chair in front of you. Slowly raise your heels off the floor while keeping your knees straight. Hold for 3 seconds, then slowly lower your heels to the floor. Do 3 sets of 8 to 12 repetitions. ? Heel slides: Lie down on the floor or the bed with your leg flat. Slowly begin to slide your heel toward your rear end (buttocks), keeping your heel on the floor. Your knee will begin to bend. Slide your heel and bend your knee until it becomes a little sore and you can feel a small amount of pressure inside your knee. Hold this position for 10 seconds. Slide your heel back down until your leg is straight on the floor. Relax for 10 seconds. Repeat this exercise 20 times. When should you call for help? Watch closely for changes in your health, and be sure to contact your doctor if:    · You have increasing knee pain or swelling or both.     · Your knee is so sore or stiff that you cannot walk on it.     · You do not get better as expected. Where can you learn more?   Go to [email or web address] and sign in to your [facility] account. Enter N068 in the [facility] box to learn more about \"Meniscus Tear: Care Instructions. \"     If you do not have an account, please click on the \"Sign Up Now\" link. Current as of: March 2, 2020               Content Version: 12.6  © 2460-1533 [employer]. Care instructions adapted under license by [facility] ([facility]). If you have questions about a medical condition or this instruction, always ask your healthcare professional. [address] any warranty or liability for your use of this information. Patient Education        Meniscus Surgery: Before Your Surgery  What is meniscus surgery? Meniscus surgery removes or fixes the cartilage between the bones in the knee. This cartilage is called the meniscus. Each knee has two of these rubbery pads of cartilage, one on either side. When a meniscus tears, your knee may be painful, swell, get stiff, or lock up. Your doctor may use small tools to remove parts of the damaged meniscus and smooth the edges. This is called a partial meniscectomy (say \"men-ih-SEK-tuh-celestina\"). In some cases, tears in the meniscus can be sewn back together. But if your meniscus can't be repaired, the doctor may remove the damaged part. Meniscus surgery is usually done as arthroscopic surgery. Your doctor uses a lighted tube called an arthroscope, or scope. He or she puts the scope and other surgical tools through small cuts in your knee. These cuts are called incisions. They leave scars that usually fade with time. The surgery will take at least 1 hour. Most people go home the same day of the surgery. You may have to use crutches after surgery. If so, be sure you have a backpack or clothes with a lot of pockets to carry items. You may be able to go back to school in 1 to 2 weeks. If you have a job and you sit at work, you may be able to go back in 1 to 2 weeks.  But if you are on your feet at work, it may take 4 to 6 weeks. If you are very physically active in your job, it may take 3 to 6 months. You may need physical rehabilitation (rehab) after surgery. The program may last for several months. At first, your physical therapist will work with you. Later, you will get exercises to do on your own. After surgery and rehab, you are likely to have less pain and more flexibility in your knee. How soon you can return to sports or exercise depends on how well you follow your rehab program and how well your knee heals. If you had a partial meniscectomy, you might be able to play sports in about 1 to 2 months. If you had meniscus repair, it may be 3 to 6 months before you can play sports. Follow-up care is a key part of your treatment and safety. Be sure to make and go to all appointments, and call your doctor if you are having problems. It's also a good idea to know your test results and keep a list of the medicines you take. How do you prepare for surgery? Surgery can be stressful. This information will help you understand what you can expect. And it will help you safely prepare for surgery. Preparing for surgery    · Be sure you have someone to take you home. Anesthesia and pain medicine will make it unsafe for you to drive or get home on your own.     · Understand exactly what surgery is planned, along with the risks, benefits, and other options.     · If you take aspirin or some other blood thinner, ask your doctor if you should stop taking it before your surgery. Make sure that you understand exactly what your doctor wants you to do. These medicines increase the risk of bleeding.     · Tell your doctor ALL the medicines, vitamins, supplements, and herbal remedies you take. Some may increase the risk of problems during your surgery.  Your doctor will tell you if you should stop taking any of them before the surgery and how soon to do it.     · Make sure your doctor and the [facility] have a copy of your advance directive. If you don't have one, you may want to prepare one. It lets others know your health care wishes. It's a good thing to have before any type of surgery or procedure. What happens on the day of surgery? · Follow the instructions exactly about when to stop eating and drinking. If you don't, your surgery may be canceled. If your doctor told you to take your medicines on the day of surgery, take them with only a sip of water.     · Take a bath or shower before you come in for your surgery. Do not apply lotions, perfumes, deodorants, or nail polish.     · Do not shave the surgical site yourself.     · Take off all jewelry and piercings. And take out contact lenses, if you wear them. At the hospital or surgery center   · Bring a picture ID.     · The area for surgery is often marked to make sure there are no errors.     · You will be kept comfortable and safe by your anesthesia provider. The anesthesia may make you sleep. Or it may just numb the area being worked on.     · The surgery will take at least 1 hour.     · Your leg may be in a leg brace to limit motion. You may need to wear a brace for 4 to 6 weeks after surgery.     · You may have a device that applies cold treatment to your knee. When should you call your doctor? · You have questions or concerns.     · You don't understand how to prepare for your surgery.     · You become ill before the surgery (such as fever, flu, or a cold).     · You need to reschedule or have changed your mind about having the surgery. Where can you learn more? Go to [email or web address] Medtech.GlobalLogic. org and sign in to your [employer] account. Enter R896 in the [facility] box to learn more about \"Meniscus Surgery: Before Your Surgery. \"     If you do not have an account, please click on the \"Sign Up Now\" link. Current as of: March 2, 2020               Content Version: 12.6  © 8715-5825 [employer]. Care instructions adapted under license by [facility] ([facility]). If you have questions about a medical condition or this instruction, always ask your healthcare professional. [address] any warranty or liability for your use of this information. Patient Education        Meniscus Surgery: What to Expect at Home  Your Recovery  Meniscus surgery removes or fixes the cartilage (meniscus) between the bones in the knee. Each knee has two of these rubbery pads of cartilage, one on either side. Meniscus repair is usually done with arthroscopic surgery. Your doctor put a lighted tube--called an arthroscope or scope--and other surgical tools through small cuts (incisions) in your knee. The incisions leave scars that usually fade in time. You will feel tired for several days. Your knee will be swollen. And you may have numbness around the cuts the doctor made (incisions) on your knee. You can put ice on the knee to reduce swelling. Most of this will go away in a few days. You should soon start seeing improvement in your knee. You may be able to return to most of your regular activities within a few weeks. But it will be several months before you have complete use of your knee. It may take as long as 6 months before your knee is strong enough for hard physical work or certain sports. You will need to build your strength and the motion of your joint with rehabilitation (rehab) exercises. In time, your knee will likely be stronger and more stable than it was before the surgery. How soon you can return to sports or exercise depends on how well you follow your rehab program and how well your knee heals. Your doctor or physical therapist will give you an idea of when you can return to these activities. If you had a partial meniscectomy, you might be able to play sports in about 4 to 6 weeks. If you had meniscus repair, it may be 3 to 6 months before you can play sports.   This care sheet gives you a general idea about how long it will take for you to recover. But each person recovers at a different pace. Follow the steps below to get better as quickly as possible. How can you care for yourself at home? Activity    · Rest when you feel tired. Getting enough sleep will help you recover. Sleep with your knee raised, but not bent. Put a pillow under your foot.     · Keep your leg raised as much as possible for the first few days.     · You may shower 24 to 48 hours after surgery, if your doctor okays it. When you shower, keep your bandage and incisions dry by taping a sheet of plastic to cover them. If you have a brace, take it off if your doctor says it is okay. It might help to sit on a shower stool.     · You will be able to stand if you have a brace or use crutches. Do not put weight on your leg until your doctor says you can. You can move around the house to do daily tasks.     · If you have a brace, leave it on except when you exercise your knee or you shower. Be careful not to put the brace on too tight. You will use it for about 2 to 6 weeks.     · If your doctor does not want you to shower or remove your brace, you can take a sponge bath.     · Wait 2 weeks or until your doctor says it is okay before you take a bath, swim, use a hot tub, or soak your leg.     · You can drive when you are no longer using crutches or a knee brace, are no longer taking prescription pain medicine, and have some control over your knee. This usually takes 1 to 2 weeks.     · How soon you can return to work depends on your job. If you sit at work, you may be able to go back in 1 to 2 weeks. But if you are on your feet at work, it may take 4 to 6 weeks. If you are very physically active in your job, it may take 3 to 6 months. Diet    · You can eat your normal diet. If your stomach is upset, try bland, low-fat foods like plain rice, broiled chicken, toast, and yogurt.  Drink plenty of fluids.     · You may notice that your bowel movements are not regular right after your surgery. This is common. Try to avoid constipation and straining with bowel movements. You may want to take a fiber supplement every day. If you have not had a bowel movement after a couple of days, ask your doctor about taking a mild laxative. Medicines    · Your doctor will tell you if and when you can restart your medicines. He or she will also give you instructions about taking any new medicines.     · If you take aspirin or some other blood thinner, ask your doctor if and when to start taking it again. Make sure that you understand exactly what your doctor wants you to do.     · Be safe with medicines. Take pain medicines exactly as directed. ? If the doctor gave you a prescription medicine for pain, take it as prescribed. ? If you are not taking a prescription pain medicine, ask your doctor if you can take an over-the-counter medicine.     · If your doctor prescribed antibiotics, take them as directed. Do not stop taking them just because you feel better. You need to take the full course of antibiotics.     · If you think your pain medicine is making you sick to your stomach:  ? Take your medicine after meals (unless your doctor has told you not to). ? Ask your doctor for a different pain medicine. Incision care    · If you have a bandage over your incisions, keep the bandage clean and dry. Follow your doctor's instructions. Some doctors want to see you before you take it off, while others may let you take it off 48 to 72 hours after your surgery.     · If you have strips of tape on the incisions, leave the tape on for a week or until it falls off. Keep the area clean and dry. Exercise    · Rehab exercises are an important part of your treatment. Your first exercises will help you improve your knee's movement and regain your muscle strength.    Ice and elevation    · To reduce swelling and pain, put ice or a cold pack on your knee for 10 to 20 minutes at a movement after taking a laxative. Where can you learn more? Go to https://[email or web address].[email or web address] [email or web address] org and sign in to your [employer] account. Enter [id number] in the [facility] box to learn more about \"Meniscus Surgery: What to Expect at Home. \"     If you do not have an account, please click on the \"Sign Up Now\" link. Current as of: March 2, 2020               Content Version: 12.6  © 2006-2020 [employer]. Care instructions adapted under license by [facility] ([facility]). If you have questions about a medical condition or this instruction, always ask your healthcare professional. [address] any warranty or liability for your use of this information. Patient Education        Knee Arthroscopy: Before Your Surgery  What is knee arthroscopy? Arthroscopy is a way to find problems and do surgery inside a joint without making a large cut (incision). Your doctor puts a lighted tube with a tiny camera and surgical tools through small incisions in your knee. The camera is called an arthroscope, or scope. In this surgery, your doctor may:  · Remove or repair a torn piece of cartilage or loose bone. · Replace a torn anterior cruciate ligament (ACL) with a piece of tissue. This repair is called a graft. · Smooth the rough surfaces of your joint. Most people go home on the day of the surgery or the next day. If you have a simple injury, it may take at least 6 weeks to recover. It may take longer if your doctor had to repair damaged tissue. You will need to limit activity while your knee heals. You may need to have physical therapy (rehab) to help your knee get stronger. If you have a desk job, you may be able to go back to work a few days after treatment of a simple injury. If you lift things or stand or walk a lot at work, it may be 2 to 6 weeks before you can go back. After surgery and rehab, you will probably have less pain. Your knee should be stronger.  You should be able to use your knee and leg better. Some people have to avoid lifting heavy objects. Follow-up care is a key part of your treatment and safety. Be sure to make and go to all appointments, and call your doctor if you are having problems. It's also a good idea to know your test results and keep a list of the medicines you take. How do you prepare for surgery? Surgery can be stressful. This information will help you understand what you can expect. And it will help you safely prepare for surgery. Preparing for surgery    · Be sure you have someone to take you home. Anesthesia and pain medicine will make it unsafe for you to drive or get home on your own.     · Understand exactly what surgery is planned, along with the risks, benefits, and other options.     · Tell your doctor ALL the medicines, vitamins, supplements, and herbal remedies you take. Some may increase the risk of problems during your surgery. Your doctor will tell you if you should stop taking any of them before the surgery and how soon to do it.     · If you take aspirin or some other blood thinner, ask your doctor if you should stop taking it before your surgery. Make sure that you understand exactly what your doctor wants you to do. These medicines increase the risk of bleeding.     · Make sure your doctor and the hospital have a copy of your advance directive. If you don't have one, you may want to prepare one. It lets others know your health care wishes. It's a good thing to have before any type of surgery or procedure. What happens on the day of surgery? · Follow the instructions exactly about when to stop eating and drinking. If you don't, your surgery may be canceled. If your doctor told you to take your medicines on the day of surgery, take them with only a sip of water.     · Take a bath or shower before you come in for your surgery.  Do not apply lotions, perfumes, deodorants, or nail polish.     · Do not shave the surgical site yourself.     · Take off all jewelry and piercings. And take out contact lenses, if you wear them. At the hospital or surgery center   · Bring a picture ID.     · The area for surgery is often marked to make sure there are no errors.     · You will be kept comfortable and safe by your anesthesia provider. The anesthesia may make you sleep. Or it may just numb the area being worked on.     · The surgery will take about 1 to 2 hours. It depends on how much repair needs to be done to your knee. When should you call your doctor? · You have questions or concerns.     · You don't understand how to prepare for your surgery.     · You become ill before the surgery (such as fever, flu, or a cold).     · You need to reschedule or have changed your mind about having the surgery. Where can you learn more? Go to https://TouchTunes Interactive Networks.NYX Interactive. org and sign in to your [employer] account. Enter F917 in the [employer] box to learn more about \"Knee Arthroscopy: Before Your Surgery. \"     If you do not have an account, please click on the \"Sign Up Now\" link. Current as of: March 2, 2020               Content Version: 12.6  © 8502-7714 [employer]. Care instructions adapted under license by [facility] ([facility]). If you have questions about a medical condition or this instruction, always ask your healthcare professional. [place] [address] any warranty or liability for your use of this information. Patient Education        Knee Arthroscopy: What to Expect at Home  Your Recovery     Arthroscopy is a way to find problems and do surgery inside a joint without making a large cut (incision). Your doctor put a lighted tube with a tiny camera--called an arthroscope, or scope--and surgical tools through small incisions in your knee. You will feel tired for several days. Your knee will be swollen. And you may notice that your skin is a different color near the cuts (incisions).  The swelling is normal and will start to go away in a few days. Keeping your leg higher than your heart will help with swelling and pain. You will probably need about 6 weeks to recover. If your doctor repaired damaged tissue, recovery will take longer. You may have to limit your activity until your knee strength and movement are back to normal. You may also be in a physical rehabilitation (rehab) program.  You may be able to return to a desk job or your normal routine in a few days. But if you do physical labor, it may be as long as 2 months before you can go back to work. This care sheet gives you a general idea about how long it will take for you to recover. But each person recovers at a different pace. Follow the steps below to get better as quickly as possible. How can you care for yourself at home? Activity    · Rest when you feel tired. Getting enough sleep will help you recover. Use pillows to raise your ankle and leg above the level of your heart.     · Try to walk each day, after your doctor has said you can. Start by walking a little more than you did the day before. Bit by bit, increase the amount you walk. Walking boosts blood flow and helps prevent pneumonia and constipation.     · You may have a brace or crutches or both.     · Your doctor will tell you how often and how much you can move your leg and knee.     · If you have a desk job, you may be able to return to work a few days after the surgery. If you lift things or stand or walk a lot at work, it may be as long as 2 months before you can return.     · You can take a shower 48 to 72 hours after surgery and clean the incisions with regular soap and water. Do not take a bath or soak your knee until your doctor says it is okay.     · Ask your doctor when you can drive again.     · If you had a repair of torn tissue, follow your doctor's instructions for lifting things or moving your knee. Diet    · You can eat your normal diet.  If your stomach is upset, try bland, low-fat foods like plain rice, broiled chicken, toast, and yogurt.     · Drink plenty of fluids, unless your doctor tells you not to.     · You may notice that your bowel movements are not regular right after your surgery. This is common. Try to avoid constipation and straining with bowel movements. You may want to take a fiber supplement every day. If you have not had a bowel movement after a couple of days, ask your doctor about taking a mild laxative. Medicines    · Your doctor will tell you if and when you can restart your medicines. He or she will also give you instructions about taking any new medicines.     · If you take aspirin or some other blood thinner, ask your doctor if and when to start taking it again. Make sure that you understand exactly what your doctor wants you to do.     · Be safe with medicines. Take pain medicines exactly as directed. ? If the doctor gave you a prescription medicine for pain, take it as prescribed. ? If you are not taking a prescription pain medicine, ask your doctor if you can take an over-the-counter medicine.     · If you think your pain medicine is making you sick to your stomach:  ? Take your medicine after meals (unless your doctor has told you not to). ? Ask your doctor for a different pain medicine.     · If your doctor prescribed antibiotics, take them as directed. Do not stop taking them just because you feel better. You need to take the full course of antibiotics. Incision care    · If you have a dressing over your cuts (incisions), keep it clean and dry. You may remove it 48 to 72 hours after the surgery.     · If your incisions are open to the air, keep the area clean and dry.     · If you have strips of tape on the incisions, leave the tape on for a week or until it falls off.    Exercise    · Move your toes and ankle as much as your bandages will allow.     · Bend and straighten your knee slowly several times during the day.     · Depending on why you had the surgery, you may have to do ankle and leg exercises. Your doctor or physical therapist will give you exercises as part of a rehabilitation program.     · Stop any activity that causes sharp pain. Talk to your doctor or physical therapist about what sports or other exercise you can do. Ice    · To reduce swelling and pain, put ice or a cold pack on your knee for 10 to 20 minutes at a time. Do this every 1 to 2 hours. Put a thin cloth between the ice and your skin. Follow-up care is a key part of your treatment and safety. Be sure to make and go to all appointments, and call your doctor if you are having problems. It's also a good idea to know your test results and keep a list of the medicines you take. When should you call for help? Call 911 anytime you think you may need emergency care. For example, call if:    · You passed out (lost consciousness).     · You have severe trouble breathing.     · You have sudden chest pain and shortness of breath, or you cough up blood. Call your doctor now or seek immediate medical care if:    · Your foot or toes are numb or tingling.     · Your foot is cool or pale, or it changes color.     · You have signs of a blood clot, such as:  ? Pain in your calf, back of the knee, thigh, or groin. ? Redness and swelling in your leg or groin.     · You are sick to your stomach or cannot keep fluids down.     · You have pain that does not get better after you take pain medicine.     · You have loose stitches, or your incision comes open.     · Bright red blood has soaked through the bandage over your incision.     · You have signs of infection, such as:  ? Increased pain, swelling, warmth, or redness. ? Red streaks leading from the incisions. ? Pus draining from the incisions. ? A fever. Watch closely for any changes in your health, and be sure to contact your doctor if:    · You do not have a bowel movement after taking a laxative.    Where can you learn more? Go to [email or web address] and sign in to your [employer] account. Enter U609 in the [facility] box to learn more about \"Knee Arthroscopy: What to Expect at Home. \"     If you do not have an account, please click on the \"Sign Up Now\" link. Current as of: March 2, 2020               Content Version: 12.6  © 2006-2020 [employer]. Care instructions adapted under license by [facility] ([facility]). If you have questions about a medical condition or this instruction, always ask your healthcare professional. [address] any warranty or liability for your use of this information.

## 2020-11-16 ENCOUNTER — TELEPHONE (OUTPATIENT)
Dept: FAMILY MEDICINE CLINIC | Age: 60
End: 2020-11-16

## 2020-12-11 ENCOUNTER — TELEPHONE (OUTPATIENT)
Dept: FAMILY MEDICINE CLINIC | Age: 60
End: 2020-12-11

## 2020-12-11 NOTE — TELEPHONE ENCOUNTER
Last Appointment   Visit date not found  Next Appointment  12/14/2020    Cristiano Irby called asking for a referral to Dr Meng Sanchez. He tried setting an appointment for he left leg.

## 2020-12-11 NOTE — TELEPHONE ENCOUNTER
I called and informed Joey Lockwood that this will be discussed on Monday. He became upset said  he couldn't understand why he couldn't get the referral. That maybe it is time to find another doctor because he already scheduled to see Dr Batool Cagle Monday. That Dr Sharon Ellis is not an Orthopedic dr or Orthopedic surgeon.

## 2020-12-23 ENCOUNTER — TELEPHONE (OUTPATIENT)
Dept: FAMILY MEDICINE CLINIC | Age: 60
End: 2020-12-23

## 2020-12-23 NOTE — TELEPHONE ENCOUNTER
PT CALLED NEEDS PA FOR LIND[facility] 1% SHAMPOO NEEDS ASAP     [facility]   [phone]      EREN GRAHAM

## 2020-12-24 RX ORDER — LINDANE 10 MG/ML
SHAMPOO, SUSPENSION TOPICAL
Qty: 1 BOTTLE | Refills: 1 | Status: SHIPPED | OUTPATIENT
Start: 2020-12-24 | End: 2020-12-27

## 2021-02-05 ENCOUNTER — OFFICE VISIT (OUTPATIENT)
Dept: ENT CLINIC | Age: 61
End: 2021-02-05
Payer: COMMERCIAL

## 2021-02-05 VITALS — HEIGHT: 67 IN | TEMPERATURE: 97.3 F | WEIGHT: 140 LBS | BODY MASS INDEX: 21.97 KG/M2

## 2021-02-05 DIAGNOSIS — H61.23 HEARING LOSS DUE TO CERUMEN IMPACTION, BILATERAL: Primary | ICD-10-CM

## 2021-02-05 PROCEDURE — G8484 FLU IMMUNIZE NO ADMIN: HCPCS | Performed by: OTOLARYNGOLOGY

## 2021-02-05 PROCEDURE — G8420 CALC BMI NORM PARAMETERS: HCPCS | Performed by: OTOLARYNGOLOGY

## 2021-02-05 PROCEDURE — 69210 REMOVE IMPACTED EAR WAX UNI: CPT | Performed by: OTOLARYNGOLOGY

## 2021-02-05 PROCEDURE — G8427 DOCREV CUR MEDS BY ELIG CLIN: HCPCS | Performed by: OTOLARYNGOLOGY

## 2021-02-05 PROCEDURE — 3017F COLORECTAL CA SCREEN DOC REV: CPT | Performed by: OTOLARYNGOLOGY

## 2021-02-05 PROCEDURE — 1036F TOBACCO NON-USER: CPT | Performed by: OTOLARYNGOLOGY

## 2021-02-05 RX ORDER — LANOLIN ALCOHOL/MO/W.PET/CERES
3 CREAM (GRAM) TOPICAL DAILY
COMMUNITY

## 2021-02-05 NOTE — PROGRESS NOTES
[facility] Otolaryngology  Dr. Jocyelyn Patricio. Aditi Tobar. Ms.Ed        Patient Name:  Hamida Laboy  :  1960     CHIEF C/O:    Chief Complaint   Patient presents with    Follow-up     6 month f/u patient states right ear is doing better    Cerumen Impaction    Ear Problem       HISTORY OBTAINED FROM:  patient    HISTORY OF PRESENT ILLNESS:       Kaylie Persaud is a 56y.o. year old male, here today for follow up of here for history of known hearing loss secondary to cerumen impactions. Patient denies any current vertigo or tinnitus. Patient denies any current difficulty swallowing shortness of breath or stridor. No current complaint of headaches or vision changes. Past Medical History:   Diagnosis Date    Hemorrhoids     Hernia     small watching it     Past Surgical History:   Procedure Laterality Date    COLON SURGERY      HEMORRHOID SURGERY      OTHER SURGICAL HISTORY  3/13/2012    Hemorrhoidectomy. Dr. Phillip Lee         Current Outpatient Medications:     melatonin 3 MG TABS tablet, Take 3 mg by mouth daily, Disp: , Rfl:   Patient has no known allergies. Social History     Tobacco Use    Smoking status: Former Smoker     Types: Cigarettes     Quit date: 2004     Years since quittin.5    Smokeless tobacco: Never Used   Substance Use Topics    Alcohol use: No     Alcohol/week: 0.0 standard drinks    Drug use: No     Family History   Problem Relation Age of Onset    Heart Disease Mother     No Known Problems Father     No Known Problems Sister     No Known Problems Brother        Review of Systems   Constitutional: Negative for chills and fever. HENT: Positive for congestion and hearing loss. Negative for ear discharge, postnasal drip, rhinorrhea, sore throat and tinnitus. Respiratory: Negative for cough and shortness of breath. Cardiovascular: Negative for chest pain and palpitations. Gastrointestinal: Negative for vomiting.    Skin: Negative for rash.   Allergic/Immunologic: Negative for environmental allergies. Neurological: Negative for dizziness and headaches. Hematological: Does not bruise/bleed easily. All other systems reviewed and are negative. Temp 97.3 °F (36.3 °C)   Ht 5' 7\" (1.702 m)   Wt 140 lb (63.5 kg)   BMI 21.93 kg/m²   Physical Exam  HENT:      Right Ear: There is impacted cerumen. Left Ear: There is no impacted cerumen. Procedure: Cerumen Impaction    Pre-op: Cerumen Impaction    Post Op: Same    Procedure: Cerumen Impaction removal    Surgeon: Samantha Jeffries    Procedure: Under microscopic assistance large cerumen impaction was removed using gentle suction and curette. TM intact B/L, Pt tolerated procedure well    Complications: None    Blood Loss Minimial      IMPRESSION/PLAN:  External infection without any left-sided superinfection. Tolerated well, continue over-the-counter cerumen impaction drops and follow-up as needed. Dr. Lida Aquino.  Otolaryngology Facial Plastic Surgery  :  [facility] Otolaryngology/Facial Plastic Surgery Residency  Associate Clinical Professor:  Carolee Zaman, [facility]

## 2021-02-25 ASSESSMENT — ENCOUNTER SYMPTOMS
SHORTNESS OF BREATH: 0
COUGH: 0
SORE THROAT: 0
RHINORRHEA: 0
VOMITING: 0

## 2023-02-15 ENCOUNTER — PROCEDURE VISIT (OUTPATIENT)
Dept: AUDIOLOGY | Age: 63
End: 2023-02-15
Payer: COMMERCIAL

## 2023-02-15 ENCOUNTER — OFFICE VISIT (OUTPATIENT)
Dept: ENT CLINIC | Age: 63
End: 2023-02-15
Payer: COMMERCIAL

## 2023-02-15 VITALS
SYSTOLIC BLOOD PRESSURE: 147 MMHG | DIASTOLIC BLOOD PRESSURE: 78 MMHG | HEART RATE: 60 BPM | HEIGHT: 68 IN | BODY MASS INDEX: 19.55 KG/M2 | WEIGHT: 129 LBS

## 2023-02-15 DIAGNOSIS — H93.8X2 EAR FULLNESS, LEFT: Primary | ICD-10-CM

## 2023-02-15 DIAGNOSIS — T16.2XXA FOREIGN BODY OF LEFT EAR, INITIAL ENCOUNTER: ICD-10-CM

## 2023-02-15 DIAGNOSIS — H93.8X2 SENSATION OF FULLNESS IN LEFT EAR: Primary | ICD-10-CM

## 2023-02-15 PROCEDURE — 3017F COLORECTAL CA SCREEN DOC REV: CPT | Performed by: OTOLARYNGOLOGY

## 2023-02-15 PROCEDURE — G8427 DOCREV CUR MEDS BY ELIG CLIN: HCPCS | Performed by: OTOLARYNGOLOGY

## 2023-02-15 PROCEDURE — G8420 CALC BMI NORM PARAMETERS: HCPCS | Performed by: OTOLARYNGOLOGY

## 2023-02-15 PROCEDURE — 99204 OFFICE O/P NEW MOD 45 MIN: CPT | Performed by: OTOLARYNGOLOGY

## 2023-02-15 PROCEDURE — 69200 CLEAR OUTER EAR CANAL: CPT | Performed by: OTOLARYNGOLOGY

## 2023-02-15 PROCEDURE — 1036F TOBACCO NON-USER: CPT | Performed by: OTOLARYNGOLOGY

## 2023-02-15 PROCEDURE — 92567 TYMPANOMETRY: CPT | Performed by: AUDIOLOGIST

## 2023-02-15 PROCEDURE — G8484 FLU IMMUNIZE NO ADMIN: HCPCS | Performed by: OTOLARYNGOLOGY

## 2023-02-15 ASSESSMENT — ENCOUNTER SYMPTOMS
WHEEZING: 0
COLOR CHANGE: 0
COUGH: 0
TROUBLE SWALLOWING: 0
CHOKING: 0
VOICE CHANGE: 0
GASTROINTESTINAL NEGATIVE: 1
SINUS PAIN: 0
STRIDOR: 0
SINUS PRESSURE: 0
SORE THROAT: 0
RHINORRHEA: 0

## 2023-02-15 ASSESSMENT — VISUAL ACUITY: OU: 1

## 2023-02-15 NOTE — PROGRESS NOTES
This patient was referred for tympanometric testing by Dr. Jeffrey Amador. Patient stated 'a bug went in his left ear canal and bit his ear drum and he feels there is a hole in the TM.'    Tympanometry normal middle ear peak pressure and compliance, right ear and a flat tympanogram, with no middle ear peak pressure, left ear. The results were reviewed with the patient. Recommendations for follow up will be made pending physician consult.     Perry Meyers CCC/MELVIN  Audiologist  [id number]  NPI#:  [id number]      Electronically signed by Aamir Case on 2/15/2023 at 7:30 AM

## 2023-02-15 NOTE — PROGRESS NOTES
[address] [facility] Otolaryngology  Dr. Flaco Galvin. MAVERICK Hamilton Ms.Ed. New Consult       Patient Name:  Love Allen  :  1960     CHIEF C/O:    Chief Complaint   Patient presents with    Ear Problem     Patient c/o left ear pain. HISTORY OBTAINED FROM:  patient    HISTORY OF PRESENT ILLNESS:       Dolores Romero is a 57y.o. year old male, here today for left ear pain. Started approximately 3 months ago. Improved now. Muffled hearing from left side. No drainage. Denies dizziness. Past Medical History:   Diagnosis Date    Hemorrhoids     Hernia     small watching it     Past Surgical History:   Procedure Laterality Date    COLON SURGERY      HEMORRHOID SURGERY      OTHER SURGICAL HISTORY  3/13/2012    Hemorrhoidectomy. Dr. Kareen Wilson         Current Outpatient Medications:     melatonin 3 MG TABS tablet, Take 3 mg by mouth daily, Disp: , Rfl:   Patient has no known allergies. Social History     Tobacco Use    Smoking status: Former     Types: Cigarettes     Quit date: 2004     Years since quittin.5    Smokeless tobacco: Never   Substance Use Topics    Alcohol use: No     Alcohol/week: 0.0 standard drinks    Drug use: No     Family History   Problem Relation Age of Onset    Heart Disease Mother     No Known Problems Father     No Known Problems Sister     No Known Problems Brother        Review of Systems   Constitutional:  Negative for activity change, fatigue and fever. HENT:  Positive for ear pain. Negative for congestion, ear discharge, hearing loss, nosebleeds, postnasal drip, rhinorrhea, sinus pressure, sinus pain, sore throat, tinnitus, trouble swallowing and voice change. Respiratory:  Negative for cough, choking, wheezing and stridor. Cardiovascular:  Negative for chest pain. Gastrointestinal: Negative. Genitourinary: Negative. Skin:  Negative for color change and rash.    Neurological:  Negative for speech difficulty, light-headedness, numbness and headaches. Hematological:  Negative for adenopathy. Psychiatric/Behavioral:  Negative for behavioral problems. BP (!) 147/78   Pulse 60   Ht 5' 7.5\" (1.715 m)   Wt 129 lb (58.5 kg)   BMI 19.91 kg/m²   Physical Exam  Constitutional:       Appearance: Normal appearance. He is normal weight. HENT:      Head: Normocephalic and atraumatic. Right Ear: Tympanic membrane, ear canal and external ear normal. No drainage. Left Ear: Tympanic membrane, ear canal and external ear normal. No drainage. Ears:      Comments: Left ear foreign body, appears to be ear plug remnant     Nose: Nose normal. No nasal deformity or septal deviation. Mouth/Throat:      Mouth: Mucous membranes are moist.   Eyes:      General: Lids are normal. Vision grossly intact. Extraocular Movements: Extraocular movements intact. Conjunctiva/sclera: Conjunctivae normal.      Pupils: Pupils are equal, round, and reactive to light. Cardiovascular:      Rate and Rhythm: Normal rate. Pulmonary:      Effort: Pulmonary effort is normal.   Musculoskeletal:         General: Normal range of motion. Cervical back: Normal range of motion. Lymphadenopathy:      Cervical: No cervical adenopathy. Skin:     Capillary Refill: Capillary refill takes less than 2 seconds. Neurological:      Mental Status: He is alert. Psychiatric:         Mood and Affect: Mood normal.       Procedure: Cerumen Removal    The patient was verbally consented. A microscope and speculum were used to visualize the external auditory canal. A left ear foreign body was removed, appearing to be remnant of ear plug. Tympanic membranes are intact following the procedure. Patient tolerated the procedure well. IMPRESSION/PLAN:  Patient seen and examined, left ear foreign body removed today with improvement in hearing. Cautioned on not sticking plug to deep in ear. Follow up 6 months for cerumen removal.     Dr. Will Hamilton D.O., Ms. Ed.  Otolaryngology Facial Plastic Surgery  :[facility] Otolaryngology/Facial Plastic Surgery Residency  Associate Clinical Professor:  Conner Cheung [facility]  1960      I have discussed the case, including pertinent history and exam findings with the resident. I have seen and examined the patient and the key elements of the encounter have been performed by me. I agree with the assessment, plan and orders as documented by the resident. Patient here for follow up of medical problems. Remainder of medical problems as per resident note.       [address] [facility], DO  2/22/23

## 2024-02-21 ENCOUNTER — OFFICE VISIT (OUTPATIENT)
Dept: FAMILY MEDICINE CLINIC | Age: 64
End: 2024-02-21
Payer: MEDICARE

## 2024-02-21 VITALS
BODY MASS INDEX: 20.09 KG/M2 | HEART RATE: 60 BPM | SYSTOLIC BLOOD PRESSURE: 118 MMHG | WEIGHT: 128 LBS | HEIGHT: 67 IN | DIASTOLIC BLOOD PRESSURE: 70 MMHG | OXYGEN SATURATION: 98 %

## 2024-02-21 DIAGNOSIS — Z86.2 H/O NORMOCYTIC NORMOCHROMIC ANEMIA: ICD-10-CM

## 2024-02-21 DIAGNOSIS — Z12.5 SCREENING FOR PROSTATE CANCER: ICD-10-CM

## 2024-02-21 DIAGNOSIS — E78.00 HYPERCHOLESTEREMIA: ICD-10-CM

## 2024-02-21 DIAGNOSIS — E55.9 HYPOVITAMINOSIS D: ICD-10-CM

## 2024-02-21 DIAGNOSIS — Z96.642 H/O TOTAL HIP ARTHROPLASTY, LEFT: ICD-10-CM

## 2024-02-21 DIAGNOSIS — Z76.89 ENCOUNTER TO ESTABLISH CARE: Primary | ICD-10-CM

## 2024-02-21 PROCEDURE — G8420 CALC BMI NORM PARAMETERS: HCPCS | Performed by: FAMILY MEDICINE

## 2024-02-21 PROCEDURE — G8484 FLU IMMUNIZE NO ADMIN: HCPCS | Performed by: FAMILY MEDICINE

## 2024-02-21 PROCEDURE — 1036F TOBACCO NON-USER: CPT | Performed by: FAMILY MEDICINE

## 2024-02-21 PROCEDURE — 99213 OFFICE O/P EST LOW 20 MIN: CPT | Performed by: FAMILY MEDICINE

## 2024-02-21 PROCEDURE — 3017F COLORECTAL CA SCREEN DOC REV: CPT | Performed by: FAMILY MEDICINE

## 2024-02-21 PROCEDURE — G8427 DOCREV CUR MEDS BY ELIG CLIN: HCPCS | Performed by: FAMILY MEDICINE

## 2024-02-21 PROCEDURE — 1123F ACP DISCUSS/DSCN MKR DOCD: CPT | Performed by: FAMILY MEDICINE

## 2024-02-21 SDOH — ECONOMIC STABILITY: FOOD INSECURITY: WITHIN THE PAST 12 MONTHS, YOU WORRIED THAT YOUR FOOD WOULD RUN OUT BEFORE YOU GOT MONEY TO BUY MORE.: NEVER TRUE

## 2024-02-21 SDOH — ECONOMIC STABILITY: INCOME INSECURITY: HOW HARD IS IT FOR YOU TO PAY FOR THE VERY BASICS LIKE FOOD, HOUSING, MEDICAL CARE, AND HEATING?: NOT HARD AT ALL

## 2024-02-21 SDOH — ECONOMIC STABILITY: HOUSING INSECURITY
IN THE LAST 12 MONTHS, WAS THERE A TIME WHEN YOU DID NOT HAVE A STEADY PLACE TO SLEEP OR SLEPT IN A SHELTER (INCLUDING NOW)?: NO

## 2024-02-21 SDOH — ECONOMIC STABILITY: FOOD INSECURITY: WITHIN THE PAST 12 MONTHS, THE FOOD YOU BOUGHT JUST DIDN'T LAST AND YOU DIDN'T HAVE MONEY TO GET MORE.: NEVER TRUE

## 2024-02-21 ASSESSMENT — PATIENT HEALTH QUESTIONNAIRE - PHQ9
1. LITTLE INTEREST OR PLEASURE IN DOING THINGS: 0
SUM OF ALL RESPONSES TO PHQ QUESTIONS 1-9: 0
SUM OF ALL RESPONSES TO PHQ9 QUESTIONS 1 & 2: 0
SUM OF ALL RESPONSES TO PHQ QUESTIONS 1-9: 0
2. FEELING DOWN, DEPRESSED OR HOPELESS: 0

## 2024-02-21 NOTE — PROGRESS NOTES
OFFICE PROGRESS NOTE      SUBJECTIVE:        Patient ID:   Leighton Schilling is a 63 y.o. male who presents for   Chief Complaint   Patient presents with    New Patient     Would like labs           HPI:     ESTABLISH CARE HERE.  NOT SEEN PRIMARY CARE PHYSICIAN FOR LONG TIME.  NO BLOOD WORK DONE FOR LONG TIME.  HAD LEFT HIP ARTHROPLASTY IN 2021 AT [facility].WAS ANEMIC FOLLOWING SURGERY.  EATING GOOD.  PHYSICALLY ACTIVE  FOR EXERCISE.  TAKING NO MEDICATIONS REGULARLY.         Prior to Admission medications    Medication Sig Start Date End Date Taking? Authorizing Provider   melatonin 3 MG TABS tablet Take 1 tablet by mouth daily   Yes Provider, Historical, MD     Social History     Socioeconomic History    Marital status: Single     Spouse name: None    Number of children: None    Years of education: None    Highest education level: None   Tobacco Use    Smoking status: Former     Current packs/day: 0.00     Types: Cigarettes     Quit date: 2004     Years since quittin.5    Smokeless tobacco: Never   Substance and Sexual Activity    Alcohol use: No     Alcohol/week: 0.0 standard drinks of alcohol    Drug use: No    Sexual activity: Never     Social Determinants of Health     Financial Resource Strain: Low Risk  (2024)    Overall Financial Resource Strain (CARDIA)     Difficulty of Paying Living Expenses: Not hard at all   Food Insecurity: No Food Insecurity (2024)    Hunger Vital Sign     Worried About Running Out of Food in the Last Year: Never true     Ran Out of Food in the Last Year: Never true   Transportation Needs: Unknown (2024)    PRAPARE - Transportation     Lack of Transportation (Non-Medical): No   Housing Stability: Unknown (2024)    Housing Stability Vital Sign     Unstable Housing in the Last Year: No       I have reviewed Vitos allergies, medications, problem list, medical, social and family history and have updated as

## 2024-02-21 NOTE — PATIENT INSTRUCTIONS
REGULAR DIET  REGULAR WALKING ADVISED.  FASTING FOR LAB WORK ONE MORNING.  NEXT APPOINTMENT IN 1 MONTH.

## 2024-03-28 ENCOUNTER — OFFICE VISIT (OUTPATIENT)
Dept: FAMILY MEDICINE CLINIC | Age: 64
End: 2024-03-28
Payer: MEDICARE

## 2024-03-28 VITALS
HEART RATE: 63 BPM | DIASTOLIC BLOOD PRESSURE: 70 MMHG | OXYGEN SATURATION: 98 % | WEIGHT: 126 LBS | SYSTOLIC BLOOD PRESSURE: 118 MMHG | BODY MASS INDEX: 19.73 KG/M2

## 2024-03-28 DIAGNOSIS — G89.29 CHRONIC PAIN OF LEFT KNEE: Primary | ICD-10-CM

## 2024-03-28 DIAGNOSIS — M25.562 CHRONIC PAIN OF LEFT KNEE: Primary | ICD-10-CM

## 2024-03-28 DIAGNOSIS — E78.00 HYPERCHOLESTEREMIA: ICD-10-CM

## 2024-03-28 DIAGNOSIS — Z86.2 H/O NORMOCYTIC NORMOCHROMIC ANEMIA: ICD-10-CM

## 2024-03-28 DIAGNOSIS — E55.9 HYPOVITAMINOSIS D: ICD-10-CM

## 2024-03-28 LAB
ALBUMIN SERPL-MCNC: 4.8 G/DL (ref 3.5–5.2)
ALP BLD-CCNC: 68 U/L (ref 40–129)
ALT SERPL-CCNC: 22 U/L (ref 0–40)
ANION GAP SERPL CALCULATED.3IONS-SCNC: 16 MMOL/L (ref 7–16)
AST SERPL-CCNC: 28 U/L (ref 0–39)
BASOPHILS ABSOLUTE: 0.09 K/UL (ref 0–0.2)
BASOPHILS RELATIVE PERCENT: 2 % (ref 0–2)
BILIRUB SERPL-MCNC: 0.5 MG/DL (ref 0–1.2)
BUN BLDV-MCNC: 16 MG/DL (ref 6–23)
CALCIUM SERPL-MCNC: 9.9 MG/DL (ref 8.6–10.2)
CHLORIDE BLD-SCNC: 102 MMOL/L (ref 98–107)
CHOLESTEROL: 265 MG/DL
CO2: 23 MMOL/L (ref 22–29)
CREAT SERPL-MCNC: 0.9 MG/DL (ref 0.7–1.2)
EOSINOPHILS ABSOLUTE: 0.06 K/UL (ref 0.05–0.5)
EOSINOPHILS RELATIVE PERCENT: 1 % (ref 0–6)
GFR SERPL CREATININE-BSD FRML MDRD: >90 ML/MIN/1.73M2
GLUCOSE BLD-MCNC: 87 MG/DL (ref 74–99)
HCT VFR BLD CALC: 44.1 % (ref 37–54)
HDLC SERPL-MCNC: 75 MG/DL
HEMOGLOBIN: 15.1 G/DL (ref 12.5–16.5)
IMMATURE GRANULOCYTES %: 0 % (ref 0–5)
IMMATURE GRANULOCYTES ABSOLUTE: <0.03 K/UL (ref 0–0.58)
LDL CHOLESTEROL: 150 MG/DL
LYMPHOCYTES ABSOLUTE: 1.95 K/UL (ref 1.5–4)
LYMPHOCYTES RELATIVE PERCENT: 43 % (ref 20–42)
MCH RBC QN AUTO: 32.4 PG (ref 26–35)
MCHC RBC AUTO-ENTMCNC: 34.2 G/DL (ref 32–34.5)
MCV RBC AUTO: 94.6 FL (ref 80–99.9)
MONOCYTES ABSOLUTE: 0.41 K/UL (ref 0.1–0.95)
MONOCYTES RELATIVE PERCENT: 9 % (ref 2–12)
NEUTROPHILS ABSOLUTE: 2.05 K/UL (ref 1.8–7.3)
NEUTROPHILS RELATIVE PERCENT: 45 % (ref 43–80)
PDW BLD-RTO: 11.9 % (ref 11.5–15)
PLATELET # BLD: 205 K/UL (ref 130–450)
PMV BLD AUTO: 11.8 FL (ref 7–12)
POTASSIUM SERPL-SCNC: 4.5 MMOL/L (ref 3.5–5)
RBC # BLD: 4.66 M/UL (ref 3.8–5.8)
SODIUM BLD-SCNC: 141 MMOL/L (ref 132–146)
TOTAL PROTEIN: 7.9 G/DL (ref 6.4–8.3)
TRIGL SERPL-MCNC: 199 MG/DL
VITAMIN D 25-HYDROXY: 30.1 NG/ML (ref 30–100)
VLDLC SERPL CALC-MCNC: 40 MG/DL
WBC # BLD: 4.6 K/UL (ref 4.5–11.5)

## 2024-03-28 PROCEDURE — G8420 CALC BMI NORM PARAMETERS: HCPCS | Performed by: FAMILY MEDICINE

## 2024-03-28 PROCEDURE — 1123F ACP DISCUSS/DSCN MKR DOCD: CPT | Performed by: FAMILY MEDICINE

## 2024-03-28 PROCEDURE — 3017F COLORECTAL CA SCREEN DOC REV: CPT | Performed by: FAMILY MEDICINE

## 2024-03-28 PROCEDURE — G8484 FLU IMMUNIZE NO ADMIN: HCPCS | Performed by: FAMILY MEDICINE

## 2024-03-28 PROCEDURE — G8427 DOCREV CUR MEDS BY ELIG CLIN: HCPCS | Performed by: FAMILY MEDICINE

## 2024-03-28 PROCEDURE — 1036F TOBACCO NON-USER: CPT | Performed by: FAMILY MEDICINE

## 2024-03-28 PROCEDURE — 99213 OFFICE O/P EST LOW 20 MIN: CPT | Performed by: FAMILY MEDICINE

## 2024-03-28 NOTE — PATIENT INSTRUCTIONS
REGULAR DIET  REGULAR WALKING ADVISED.  BLOOD DRAW  NOW FOR LAB. TESTING.   NEXT APPOINTMENT IN 2 MONTHS FOR ANNUAL PHYSICAL EXAMINATION.

## 2024-03-28 NOTE — PROGRESS NOTES
OFFICE PROGRESS NOTE      SUBJECTIVE:        Patient ID:   Leighton Schilling is a 63 y.o. male who presents for   Chief Complaint   Patient presents with    Discuss Labs     Did not get labs done           HPI:     FASTING FOR LAB WORK THIS MORNING.  NO OTHER PROBLEM AT THIS TIME.  FEELS GOOD.   EATING  GOOD.  PHYSICALLY ACTIVE  FOR EXERCISE.  TAKING MEDICATIONS REGULARLY.         Prior to Admission medications    Medication Sig Start Date End Date Taking? Authorizing Provider   melatonin 3 MG TABS tablet Take 1 tablet by mouth daily   Yes Provider, Historical, MD     Social History     Socioeconomic History    Marital status: Single     Spouse name: None    Number of children: None    Years of education: None    Highest education level: None   Tobacco Use    Smoking status: Former     Current packs/day: 0.00     Types: Cigarettes     Quit date: 2004     Years since quittin.6    Smokeless tobacco: Never   Substance and Sexual Activity    Alcohol use: No     Alcohol/week: 0.0 standard drinks of alcohol    Drug use: No    Sexual activity: Never     Social Determinants of Health     Financial Resource Strain: Low Risk  (2024)    Overall Financial Resource Strain (CARDIA)     Difficulty of Paying Living Expenses: Not hard at all   Food Insecurity: No Food Insecurity (2024)    Hunger Vital Sign     Worried About Running Out of Food in the Last Year: Never true     Ran Out of Food in the Last Year: Never true   Transportation Needs: Unknown (2024)    PRAPARE - Transportation     Lack of Transportation (Non-Medical): No   Housing Stability: Unknown (2024)    Housing Stability Vital Sign     Unstable Housing in the Last Year: No       I have reviewed Vitos allergies, medications, problem list, medical, social and family history and have updated as needed in the electronic medical record”  Review Of Systems:    Skin: no abnormal pigmentation, rash,

## 2024-03-29 NOTE — RESULT ENCOUNTER NOTE
LDL CHOLESTEROL  LEVEL ARE HIGH.  RECOMMEND:  LOW SALT, LOW CARB. AND LOW FAT DIET.  REGULAR EXERCISE.  CONTINUE CURRENT MEDICATIONS.   DISCUSS NEXT VISIT.

## 2024-04-10 ENCOUNTER — OFFICE VISIT (OUTPATIENT)
Dept: ENT CLINIC | Age: 64
End: 2024-04-10
Payer: MEDICARE

## 2024-04-10 VITALS
HEART RATE: 57 BPM | WEIGHT: 123 LBS | BODY MASS INDEX: 19.26 KG/M2 | SYSTOLIC BLOOD PRESSURE: 135 MMHG | DIASTOLIC BLOOD PRESSURE: 74 MMHG

## 2024-04-10 DIAGNOSIS — H61.23 BILATERAL IMPACTED CERUMEN: Primary | ICD-10-CM

## 2024-04-10 PROCEDURE — 69210 REMOVE IMPACTED EAR WAX UNI: CPT | Performed by: OTOLARYNGOLOGY

## 2024-04-12 ASSESSMENT — ENCOUNTER SYMPTOMS
SHORTNESS OF BREATH: 0
VOMITING: 0

## 2024-04-12 NOTE — PROGRESS NOTES
[facility]  ROX AlcalaO. Ms.Ed        Patient Name:  Leighton Schilling  :  1960     CHIEF C/O:    Chief Complaint   Patient presents with   • Follow-up     Cerumen        HISTORY OBTAINED FROM:  patient    HISTORY OF PRESENT ILLNESS:       Leighton is a 63 y.o. year old male, here today for follow up of:       Patient is seen and examined for history of bilateral cerumen impactions with associated hearing loss, she is feeling bilateral ear fullness without vertigo or ear pain.  No history of ear drainage.  No other complaints today of epistaxis congestion postnasal drainage hoarseness sore throat fever chills or neck mass.         Past Medical History:   Diagnosis Date   • Hemorrhoids    • Hernia     small watching it     Past Surgical History:   Procedure Laterality Date   • COLON SURGERY     • HEMORRHOID SURGERY     • OTHER SURGICAL HISTORY  3/13/2012    Hemorrhoidectomy. Dr. Morrison   • STOMACH SURGERY         Current Outpatient Medications:   •  melatonin 3 MG TABS tablet, Take 1 tablet by mouth daily (Patient not taking: Reported on 4/10/2024), Disp: , Rfl:   Patient has no known allergies.  Social History     Tobacco Use   • Smoking status: Former     Current packs/day: 0.00     Types: Cigarettes     Quit date: 2004     Years since quittin.7   • Smokeless tobacco: Never   Substance Use Topics   • Alcohol use: No     Alcohol/week: 0.0 standard drinks of alcohol   • Drug use: No     Family History   Problem Relation Age of Onset   • Heart Disease Mother    • No Known Problems Father    • No Known Problems Sister    • No Known Problems Brother        Review of Systems   Constitutional:  Negative for chills and fever.   HENT:  Positive for hearing loss. Negative for ear discharge.    Respiratory:  Negative for cough and shortness of breath.    Cardiovascular:  Negative for chest pain and palpitations.   Gastrointestinal:  Negative for vomiting.   Skin:  Negative

## 2024-07-25 ENCOUNTER — OFFICE VISIT (OUTPATIENT)
Dept: FAMILY MEDICINE CLINIC | Age: 64
End: 2024-07-25
Payer: MEDICARE

## 2024-07-25 VITALS
WEIGHT: 125 LBS | OXYGEN SATURATION: 97 % | BODY MASS INDEX: 19.58 KG/M2 | SYSTOLIC BLOOD PRESSURE: 136 MMHG | HEART RATE: 58 BPM | DIASTOLIC BLOOD PRESSURE: 80 MMHG

## 2024-07-25 DIAGNOSIS — E55.9 HYPOVITAMINOSIS D: ICD-10-CM

## 2024-07-25 DIAGNOSIS — E78.2 MIXED HYPERCHOLESTEROLEMIA AND HYPERTRIGLYCERIDEMIA: Primary | ICD-10-CM

## 2024-07-25 PROCEDURE — G8427 DOCREV CUR MEDS BY ELIG CLIN: HCPCS | Performed by: FAMILY MEDICINE

## 2024-07-25 PROCEDURE — 1123F ACP DISCUSS/DSCN MKR DOCD: CPT | Performed by: FAMILY MEDICINE

## 2024-07-25 PROCEDURE — 99214 OFFICE O/P EST MOD 30 MIN: CPT | Performed by: FAMILY MEDICINE

## 2024-07-25 PROCEDURE — 3017F COLORECTAL CA SCREEN DOC REV: CPT | Performed by: FAMILY MEDICINE

## 2024-07-25 PROCEDURE — G8420 CALC BMI NORM PARAMETERS: HCPCS | Performed by: FAMILY MEDICINE

## 2024-07-25 PROCEDURE — 1036F TOBACCO NON-USER: CPT | Performed by: FAMILY MEDICINE

## 2024-07-25 RX ORDER — ROSUVASTATIN CALCIUM 10 MG/1
10 TABLET, COATED ORAL NIGHTLY
Qty: 30 TABLET | Refills: 3 | Status: SHIPPED | OUTPATIENT
Start: 2024-07-25

## 2024-07-25 RX ORDER — ERGOCALCIFEROL 1.25 MG/1
50000 CAPSULE ORAL WEEKLY
Qty: 12 CAPSULE | Refills: 1 | Status: SHIPPED | OUTPATIENT
Start: 2024-07-25

## 2024-07-25 NOTE — PATIENT INSTRUCTIONS
LOW SALT FOR BLOOD PRESSURE CONTROL.  LOW FAT DIET FOR CHOLESTEROL CONTROL.  DRINK ENOUGH FLUIDS FOR BETTER KIDNEY FUNCTION.  TAKE CRESTOR 10 MG. NIGHTLY   FOR CHOLESTEROL CONTROL..  TAKE  VITAMIN [id number] [id number] UNITS WEEKLY FOR IMPROVING DEFICIENCY.  REGULAR WALKING ADVISED.  FASTING FOR LAB WORK PRIOR TO NEXT VISIT.  NEXT APPOINTMENT IN 3 MONTHS FOR ANNUAL PHYSICAL EXAMINATION..

## 2024-07-25 NOTE — PROGRESS NOTES
throat  Respiratory: no cough, pleuritic chest pain, dyspnea, or wheezing  Cardiovascular: no chest pain, angina, dyspnea on exertion, orthopnea, PND, palpitations, or claudication  Gastrointestinal: no nausea, vomiting, heartburn, diarrhea, constipation, bloating,  abdominal pain, or blood per rectum.Appetite is good  Genitourinary: no urinary urgency, frequency, dysuria, nocturia, hesitancy, or incontinence  Musculoskeletal: joint pains off and on. Morning stiffness. Ambulating well  Neurologic: no paralysis, paresis, paresthesia, seizures, tremors, or headaches  Hematologic/Lymphatic/Immunologic: no anemia, abnormal bleeding/bruising, fever, chills, night sweats, swollen glands, or unexplained weight loss  Endocrine: no heat or cold intolerance and no polyphagia, polydipsia, or polyuria        OBJECTIVE:     VS:  Wt Readings from Last 3 Encounters:   07/25/24 56.7 kg (125 lb)   04/10/24 55.8 kg (123 lb)   03/28/24 57.2 kg (126 lb)     Temp Readings from Last 3 Encounters:   02/05/21 97.3 °F (36.3 °C)   11/12/20 98 °F (36.7 °C)   08/04/20 98 °F (36.7 °C)     BP Readings from Last 3 Encounters:   07/25/24 136/80   04/10/24 135/74   03/28/24 118/70        General appearance: Alert, Awake, Oriented times 3, no distress  Skin: Warm and dry  Head: Normocephalic. No masses, lesions or tenderness noted  Eyes: Conjunctivae appear normal. PERLE  Ears: External ears normal  Nose/Sinuses: Nares normal. Septum midline. Mucosa normal. No drainage  Oropharynx: Oropharynx clear with no exudate seen  Neck: Neck supple. No jugular venous distension, lymphadenopathy or thyromegaly Trachea midline  Chest:  Normal. Movements are Normal and Equal.  Lungs: Lungs clear to auscultation bilaterally.  No ronchi, crackles or wheezes  Heart: S1 S2  Regular rate and rhythm. No rub, murmur or gallop  Abdomen: Abdomen soft, non-tender. BS normal. No masses, organomegaly.  Back: Grossly Normal and Equal. DTR are Normal. SLR is

## 2024-07-26 DIAGNOSIS — E78.2 MIXED HYPERCHOLESTEROLEMIA AND HYPERTRIGLYCERIDEMIA: ICD-10-CM

## 2024-07-26 DIAGNOSIS — E55.9 HYPOVITAMINOSIS D: ICD-10-CM

## 2024-07-30 DIAGNOSIS — Z12.5 SCREENING FOR PROSTATE CANCER: ICD-10-CM

## 2024-07-30 LAB
ALBUMIN: 4.3 G/DL (ref 3.5–5.2)
ALP BLD-CCNC: 71 U/L (ref 40–129)
ALT SERPL-CCNC: 23 U/L (ref 0–40)
ANION GAP SERPL CALCULATED.3IONS-SCNC: 19 MMOL/L (ref 7–16)
AST SERPL-CCNC: 32 U/L (ref 0–39)
BILIRUB SERPL-MCNC: 0.5 MG/DL (ref 0–1.2)
BUN BLDV-MCNC: 19 MG/DL (ref 6–23)
CALCIUM SERPL-MCNC: 9.2 MG/DL (ref 8.6–10.2)
CHLORIDE BLD-SCNC: 99 MMOL/L (ref 98–107)
CHOLESTEROL, TOTAL: 215 MG/DL
CO2: 22 MMOL/L (ref 22–29)
CREAT SERPL-MCNC: 0.9 MG/DL (ref 0.7–1.2)
GFR, ESTIMATED: >90 ML/MIN/1.73M2
GLUCOSE BLD-MCNC: 90 MG/DL (ref 74–99)
HDLC SERPL-MCNC: 91 MG/DL
LDL CHOLESTEROL: 109 MG/DL
POTASSIUM SERPL-SCNC: 4.7 MMOL/L (ref 3.5–5)
PROSTATE SPECIFIC ANTIGEN: 0.96 NG/ML (ref 0–4)
SODIUM BLD-SCNC: 140 MMOL/L (ref 132–146)
TOTAL PROTEIN: 7.6 G/DL (ref 6.4–8.3)
TRIGL SERPL-MCNC: 77 MG/DL
VITAMIN D 25-HYDROXY: 29.6 NG/ML (ref 30–100)
VLDLC SERPL CALC-MCNC: 15 MG/DL

## 2024-08-22 RX ORDER — ROSUVASTATIN CALCIUM 10 MG/1
10 TABLET, COATED ORAL NIGHTLY
Qty: 90 TABLET | Refills: 0 | Status: SHIPPED | OUTPATIENT
Start: 2024-08-22

## 2024-11-14 RX ORDER — ROSUVASTATIN CALCIUM 10 MG/1
10 TABLET, COATED ORAL NIGHTLY
Qty: 90 TABLET | Refills: 1 | Status: SHIPPED | OUTPATIENT
Start: 2024-11-14

## 2025-02-11 RX ORDER — ROSUVASTATIN CALCIUM 10 MG/1
10 TABLET, COATED ORAL NIGHTLY
Qty: 30 TABLET | Refills: 0 | Status: SHIPPED | OUTPATIENT
Start: 2025-02-11

## 2025-02-11 NOTE — TELEPHONE ENCOUNTER
Name of Medication(s) Requested:  Requested Prescriptions     Pending Prescriptions Disp Refills    rosuvastatin (CRESTOR) 10 MG tablet 90 tablet 1     Sig: Take 1 tablet by mouth nightly       Medication is on current medication list Yes    Dosage and directions were verified? Yes    Quantity verified: 90 day supply     Pharmacy Verified?  Yes    Last Appointment:  7/25/2024    Future appts:  No future appointments.     (If no appt send self scheduling link. .REFILLAPPT)  Scheduling request sent?     [] Yes  [x] No    Does patient need updated?  [] Yes  [x] No

## 2025-03-31 ENCOUNTER — TELEPHONE (OUTPATIENT)
Dept: PHARMACY | Facility: CLINIC | Age: 65
End: 2025-03-31

## 2025-03-31 NOTE — TELEPHONE ENCOUNTER
[facility] CLINICAL PHARMACY: ADHERENCE REVIEW  Identified care gap per [place]: fills at [facility]: Statin adherence    ASSESSMENT  STATIN ADHERENCE    Insurance Records claims through 3/24/25 (Prior Year PDC = not reported; YTD PDC = FIRST FILL; Potential Fail Date: 25):   ROSUVASTATIN TAB 10MG last filled on 25 for 30 day supply. Next refill due: 03/15/25    Prescribed si tablet/capsule daily    Per Insurer Portal: last filled on 25 for 30 day supply.     Per [facility]: not contacted    Lab Results   Component Value Date    CHOL 215 (H) 2024    TRIG 77 2024    HDL 91 2024     Lab Results   Component Value Date     (H) 2024      ALT   Date Value Ref Range Status   2024 23 0 - 40 U/L Final     AST   Date Value Ref Range Status   2024 32 0 - 39 U/L Final     The 10-year ASCVD risk score (Lorraine VALDEZ, et al., 2019) is: 11.5%    Values used to calculate the score:      Age: 64 years      Sex: Male      Is Non- : No      Diabetic: No      Tobacco smoker: No      Systolic Blood Pressure: 136 mmHg      Is BP treated: No      HDL Cholesterol: 91 mg/dL      Total Cholesterol: 215 mg/dL     PLAN    The following are interventions that have been identified:   Patient OVERDUE refilling ROSUVASTATIN TAB 10MG and active on home medication list.   Patient eligible for 100 day supply of ROSUVASTATIN TAB 10MG  Patient needs to schedule an appt with provider for refills-25    Attempting to reach patient to review.  Left message asking for return call.    Last Visit: 24  Next Visit: 25    [facility]   [facility]  [phone] Option 1     For Pharmacy Admin Tracking Only    Program: [email or web address]  CPA in place:  No  Gap Closed?: No   Time Spent (min): 5

## 2025-04-01 ENCOUNTER — OFFICE VISIT (OUTPATIENT)
Dept: FAMILY MEDICINE CLINIC | Age: 65
End: 2025-04-01
Payer: MEDICARE

## 2025-04-01 VITALS
DIASTOLIC BLOOD PRESSURE: 78 MMHG | OXYGEN SATURATION: 98 % | BODY MASS INDEX: 20.2 KG/M2 | HEART RATE: 60 BPM | WEIGHT: 129 LBS | SYSTOLIC BLOOD PRESSURE: 120 MMHG

## 2025-04-01 DIAGNOSIS — Z91.199 NONCOMPLIANCE: ICD-10-CM

## 2025-04-01 DIAGNOSIS — E55.9 HYPOVITAMINOSIS D: ICD-10-CM

## 2025-04-01 DIAGNOSIS — E78.2 MIXED HYPERCHOLESTEROLEMIA AND HYPERTRIGLYCERIDEMIA: Primary | ICD-10-CM

## 2025-04-01 PROCEDURE — 1123F ACP DISCUSS/DSCN MKR DOCD: CPT | Performed by: FAMILY MEDICINE

## 2025-04-01 PROCEDURE — 99214 OFFICE O/P EST MOD 30 MIN: CPT | Performed by: FAMILY MEDICINE

## 2025-04-01 RX ORDER — ROSUVASTATIN CALCIUM 10 MG/1
10 TABLET, COATED ORAL NIGHTLY
Qty: 90 TABLET | Refills: 0 | Status: SHIPPED | OUTPATIENT
Start: 2025-04-01

## 2025-04-01 SDOH — ECONOMIC STABILITY: FOOD INSECURITY: WITHIN THE PAST 12 MONTHS, THE FOOD YOU BOUGHT JUST DIDN'T LAST AND YOU DIDN'T HAVE MONEY TO GET MORE.: NEVER TRUE

## 2025-04-01 SDOH — ECONOMIC STABILITY: FOOD INSECURITY: WITHIN THE PAST 12 MONTHS, YOU WORRIED THAT YOUR FOOD WOULD RUN OUT BEFORE YOU GOT MONEY TO BUY MORE.: NEVER TRUE

## 2025-04-01 ASSESSMENT — PATIENT HEALTH QUESTIONNAIRE - PHQ9
1. LITTLE INTEREST OR PLEASURE IN DOING THINGS: NOT AT ALL
SUM OF ALL RESPONSES TO PHQ QUESTIONS 1-9: 0
2. FEELING DOWN, DEPRESSED OR HOPELESS: NOT AT ALL
SUM OF ALL RESPONSES TO PHQ QUESTIONS 1-9: 0

## 2025-04-01 NOTE — PROGRESS NOTES
OFFICE PROGRESS NOTE      SUBJECTIVE:        Patient ID:   Leighton Schilling is a 64 y.o. male who presents for   Chief Complaint   Patient presents with    Cholesterol Problem           HPI:     RECHECK BP, CHOLESTEROL AND HYPOVITAMINOSIS D.  NO REASON FOR SKIPPING APPOINTMENTS.  MEDICATION REFILL.  FEELS GOOD.   WATCHING DIET GOOD.  WALKING SOME IN HOUSE FOR EXERCISE.  TAKING MEDICATIONS REGULARLY.         Prior to Admission medications    Medication Sig Start Date End Date Taking? Authorizing Provider   vitamin D (ERGOCALCIFEROL) 1.25 MG (92339 UT) CAPS capsule Take 1 capsule by mouth once a week 24  Yes Parish Shine MD   rosuvastatin (CRESTOR) 10 MG tablet Take 1 tablet by mouth nightly 25  Yes Parish Shine MD     Social History     Socioeconomic History    Marital status: Single     Spouse name: None    Number of children: None    Years of education: None    Highest education level: None   Tobacco Use    Smoking status: Former     Current packs/day: 0.00     Types: Cigarettes     Quit date: 2004     Years since quittin.6    Smokeless tobacco: Never   Substance and Sexual Activity    Alcohol use: No     Alcohol/week: 0.0 standard drinks of alcohol    Drug use: No    Sexual activity: Never     Social Drivers of Health     Financial Resource Strain: Low Risk  (2024)    Overall Financial Resource Strain (CARDIA)     Difficulty of Paying Living Expenses: Not hard at all   Food Insecurity: No Food Insecurity (2025)    Hunger Vital Sign     Worried About Running Out of Food in the Last Year: Never true     Ran Out of Food in the Last Year: Never true   Transportation Needs: No Transportation Needs (2025)    PRAPARE - Transportation     Lack of Transportation (Medical): No     Lack of Transportation (Non-Medical): No   Housing Stability: Low Risk  (2025)    Housing Stability Vital Sign     Unable to Pay for Housing in the Last Year: No

## 2025-04-01 NOTE — PATIENT INSTRUCTIONS
LOW SALT FOR BLOOD PRESSURE CONTROL.  LOW FAT DIET FOR CHOLESTEROL CONTROL.  DRINK ENOUGH FLUIDS FOR BETTER KIDNEY FUNCTION.  TAKE CRESTOR 10 MG. NIGHTLY   FOR CHOLESTEROL CONTROL..  TAKE  VITAMIN D-3 84832 UNITS WEEKLY FOR IMPROVING DEFICIENCY.  DISCUSSED AND ADVISED COMPLIANCE.  REGULAR WALKING ADVISED.  FASTING FOR LAB WORK  ONE MORNING.  NEXT APPOINTMENT IN 1 MONTHS FOR ANNUAL PHYSICAL EXAMINATION..

## 2025-04-02 DIAGNOSIS — E55.9 HYPOVITAMINOSIS D: ICD-10-CM

## 2025-04-02 DIAGNOSIS — E78.2 MIXED HYPERCHOLESTEROLEMIA AND HYPERTRIGLYCERIDEMIA: ICD-10-CM

## 2025-04-02 LAB
ALBUMIN: 4.2 G/DL (ref 3.5–5.2)
ALP BLD-CCNC: 65 U/L (ref 40–129)
ALT SERPL-CCNC: 23 U/L (ref 0–40)
ANION GAP SERPL CALCULATED.3IONS-SCNC: 14 MMOL/L (ref 7–16)
AST SERPL-CCNC: 27 U/L (ref 0–39)
BILIRUB SERPL-MCNC: 0.3 MG/DL (ref 0–1.2)
BUN BLDV-MCNC: 18 MG/DL (ref 6–23)
CALCIUM SERPL-MCNC: 9.5 MG/DL (ref 8.6–10.2)
CHLORIDE BLD-SCNC: 99 MMOL/L (ref 98–107)
CHOLESTEROL, TOTAL: 176 MG/DL
CO2: 26 MMOL/L (ref 22–29)
CREAT SERPL-MCNC: 0.9 MG/DL (ref 0.7–1.2)
GFR, ESTIMATED: >90 ML/MIN/1.73M2
GLUCOSE BLD-MCNC: 90 MG/DL (ref 74–99)
HDLC SERPL-MCNC: 77 MG/DL
LDL CHOLESTEROL: 82 MG/DL
POTASSIUM SERPL-SCNC: 4.5 MMOL/L (ref 3.5–5)
SODIUM BLD-SCNC: 139 MMOL/L (ref 132–146)
TOTAL PROTEIN: 7.4 G/DL (ref 6.4–8.3)
TRIGL SERPL-MCNC: 84 MG/DL
VITAMIN D 25-HYDROXY: 25.7 NG/ML (ref 30–100)
VLDLC SERPL CALC-MCNC: 17 MG/DL

## 2025-04-11 ENCOUNTER — RESULTS FOLLOW-UP (OUTPATIENT)
Dept: FAMILY MEDICINE CLINIC | Age: 65
End: 2025-04-11

## 2025-04-11 NOTE — RESULT ENCOUNTER NOTE
VITAMIN D LEVEL LOW. TAKE VITAMIN D-3 22669 UNITS WEEKLY REGULARLY.  CALL PATIENT FOR APPOINTMENT IN 1 MONTH FOR FU..

## 2025-04-17 RX ORDER — ROSUVASTATIN CALCIUM 10 MG/1
10 TABLET, COATED ORAL NIGHTLY
Qty: 90 TABLET | Refills: 1 | Status: SHIPPED | OUTPATIENT
Start: 2025-04-17

## 2025-04-17 NOTE — TELEPHONE ENCOUNTER
Name of Medication(s) Requested:  Requested Prescriptions     Pending Prescriptions Disp Refills    rosuvastatin (CRESTOR) 10 MG tablet 90 tablet 0     Sig: Take 1 tablet by mouth nightly       Medication is on current medication list Yes    Dosage and directions were verified? Yes    Quantity verified: 90 day supply     Pharmacy Verified?  Yes    Last Appointment:  4/1/2025    Future appts:  Future Appointments   Date Time Provider Department Center   6/25/2025  9:15 AM Parish Shine MD [facility]        (If no appt send self scheduling link. .REFILLAPPT)  Scheduling request sent?     [] Yes  [x] No    Does patient need updated?  [] Yes  [x] No

## 2025-05-03 ENCOUNTER — HOSPITAL ENCOUNTER (EMERGENCY)
Age: 65
Discharge: HOME | End: 2025-05-04
Attending: EMERGENCY MEDICINE
Payer: MEDICARE

## 2025-05-03 DIAGNOSIS — R07.9 CHEST PAIN, UNSPECIFIED TYPE: Primary | ICD-10-CM

## 2025-05-03 DIAGNOSIS — R00.2 PALPITATIONS: ICD-10-CM

## 2025-05-03 PROCEDURE — 99285 EMERGENCY DEPT VISIT HI MDM: CPT

## 2025-05-04 ENCOUNTER — APPOINTMENT (OUTPATIENT)
Dept: GENERAL RADIOLOGY | Age: 65
End: 2025-05-04
Payer: MEDICARE

## 2025-05-04 VITALS
SYSTOLIC BLOOD PRESSURE: 144 MMHG | RESPIRATION RATE: 13 BRPM | OXYGEN SATURATION: 98 % | TEMPERATURE: 98.4 F | HEART RATE: 61 BPM | DIASTOLIC BLOOD PRESSURE: 73 MMHG

## 2025-05-04 LAB
ALBUMIN SERPL-MCNC: 4.3 G/DL (ref 3.5–5.2)
ALP SERPL-CCNC: 69 U/L (ref 40–129)
ALT SERPL-CCNC: 19 U/L (ref 0–40)
ANION GAP SERPL CALCULATED.3IONS-SCNC: 10 MMOL/L (ref 7–16)
AST SERPL-CCNC: 28 U/L (ref 0–39)
BASOPHILS # BLD: 0.11 K/UL (ref 0–0.2)
BASOPHILS NFR BLD: 2 % (ref 0–2)
BILIRUB SERPL-MCNC: 0.3 MG/DL (ref 0–1.2)
BNP SERPL-MCNC: 84 PG/ML (ref 0–450)
BUN SERPL-MCNC: 17 MG/DL (ref 6–23)
CALCIUM SERPL-MCNC: 8.7 MG/DL (ref 8.6–10.2)
CHLORIDE SERPL-SCNC: 103 MMOL/L (ref 98–107)
CO2 SERPL-SCNC: 24 MMOL/L (ref 22–29)
CREAT SERPL-MCNC: 0.8 MG/DL (ref 0.7–1.2)
D-DIMER QUANTITATIVE: <200 NG/ML DDU (ref 0–230)
EKG ATRIAL RATE: 65 BPM
EKG P AXIS: 67 DEGREES
EKG P-R INTERVAL: 138 MS
EKG Q-T INTERVAL: 416 MS
EKG QRS DURATION: 90 MS
EKG QTC CALCULATION (BAZETT): 432 MS
EKG R AXIS: 72 DEGREES
EKG T AXIS: 22 DEGREES
EKG VENTRICULAR RATE: 65 BPM
EOSINOPHIL # BLD: 0.15 K/UL (ref 0.05–0.5)
EOSINOPHILS RELATIVE PERCENT: 2 % (ref 0–6)
ERYTHROCYTE [DISTWIDTH] IN BLOOD BY AUTOMATED COUNT: 11.5 % (ref 11.5–15)
GFR, ESTIMATED: >90 ML/MIN/1.73M2
GLUCOSE SERPL-MCNC: 98 MG/DL (ref 74–99)
HCT VFR BLD AUTO: 38.8 % (ref 37–54)
HGB BLD-MCNC: 13.8 G/DL (ref 12.5–16.5)
IMM GRANULOCYTES # BLD AUTO: <0.03 K/UL (ref 0–0.58)
IMM GRANULOCYTES NFR BLD: 0 % (ref 0–5)
LYMPHOCYTES NFR BLD: 1.97 K/UL (ref 1.5–4)
LYMPHOCYTES RELATIVE PERCENT: 30 % (ref 20–42)
MAGNESIUM SERPL-MCNC: 1.7 MG/DL (ref 1.6–2.6)
MCH RBC QN AUTO: 32.9 PG (ref 26–35)
MCHC RBC AUTO-ENTMCNC: 35.6 G/DL (ref 32–34.5)
MCV RBC AUTO: 92.6 FL (ref 80–99.9)
MONOCYTES NFR BLD: 0.61 K/UL (ref 0.1–0.95)
MONOCYTES NFR BLD: 9 % (ref 2–12)
NEUTROPHILS NFR BLD: 56 % (ref 43–80)
NEUTS SEG NFR BLD: 3.7 K/UL (ref 1.8–7.3)
PLATELET # BLD AUTO: 212 K/UL (ref 130–450)
PMV BLD AUTO: 10.9 FL (ref 7–12)
POTASSIUM SERPL-SCNC: 3.3 MMOL/L (ref 3.5–5)
PROT SERPL-MCNC: 7.3 G/DL (ref 6.4–8.3)
RBC # BLD AUTO: 4.19 M/UL (ref 3.8–5.8)
SODIUM SERPL-SCNC: 137 MMOL/L (ref 132–146)
TROPONIN I SERPL HS-MCNC: 8 NG/L (ref 0–22)
TROPONIN I SERPL HS-MCNC: <6 NG/L (ref 0–22)
WBC OTHER # BLD: 6.6 K/UL (ref 4.5–11.5)

## 2025-05-04 PROCEDURE — 93005 ELECTROCARDIOGRAM TRACING: CPT

## 2025-05-04 PROCEDURE — 6370000000 HC RX 637 (ALT 250 FOR IP)

## 2025-05-04 PROCEDURE — 83880 ASSAY OF NATRIURETIC PEPTIDE: CPT

## 2025-05-04 PROCEDURE — 85379 FIBRIN DEGRADATION QUANT: CPT

## 2025-05-04 PROCEDURE — 93010 ELECTROCARDIOGRAM REPORT: CPT | Performed by: INTERNAL MEDICINE

## 2025-05-04 PROCEDURE — 71045 X-RAY EXAM CHEST 1 VIEW: CPT

## 2025-05-04 PROCEDURE — 85025 COMPLETE CBC W/AUTO DIFF WBC: CPT

## 2025-05-04 PROCEDURE — 84484 ASSAY OF TROPONIN QUANT: CPT

## 2025-05-04 PROCEDURE — 83735 ASSAY OF MAGNESIUM: CPT

## 2025-05-04 PROCEDURE — 80053 COMPREHEN METABOLIC PANEL: CPT

## 2025-05-04 RX ORDER — ASPIRIN 81 MG/1
324 TABLET, CHEWABLE ORAL ONCE
Status: DISCONTINUED | OUTPATIENT
Start: 2025-05-04 | End: 2025-05-04

## 2025-05-04 RX ADMIN — POTASSIUM BICARBONATE 50 MEQ: 978 TABLET, EFFERVESCENT ORAL at 02:50

## 2025-05-04 NOTE — ED PROVIDER NOTES
[facility] EMERGENCY DEPARTMENT  EMERGENCY DEPARTMENT ENCOUNTER        Pt Name: Leighton Schilling  MRN: [id number]  Birthdate 1960  Date of evaluation: 5/3/2025  Provider: Kiko Salmeron MD  PCP: Parish Shine MD  Note Started: 12:04 AM EDT 5/4/25    CHIEF COMPLAINT       Chief Complaint   Patient presents with    Chest Pain     Pt c/o chest pain that began around 2130/2200       HISTORY OF PRESENT ILLNESS: 1 or more Elements   History From: Patient    Limitations to history : None    Leighton Schilling is a 64 y.o. male with no significant past medical history who presents with complaints of chest pain/palpitations that began around 2200 tonight.  Patient states he initially began having palpitations and states it was more palpitations than an actual chest pain.  Patient states he feels improved at this time and denies any shortness of breath, abdominal pain, nausea vomiting or diarrhea, fevers or chills, headache, blurry vision, numbness or tingling in the extremities, dizziness lightheadedness.  Patient denies any falls or trauma.  Patient denies any tobacco, EtOH, illicit drug use.  Of note, patient denies any chest pain or palpitations at this time.    Nursing Notes were all reviewed and agreed with or any disagreements were addressed in the HPI.    ROS:   Pertinent positives and negatives are stated within HPI, all other systems reviewed and are negative.    --------------------------------------------- PAST HISTORY ---------------------------------------------  Past Medical History:  has a past medical history of Hemorrhoids and Hernia.    Past Surgical History:  has a past surgical history that includes other surgical history (3/13/2012); Hemorrhoid surgery (2012); Stomach surgery; and Colon surgery (2013).    Social History:  reports that he quit smoking about 20 years ago. His smoking use included cigarettes. He has never used smokeless tobacco. He reports that he does

## 2025-05-04 NOTE — ED NOTES
Pt is becoming very rude. Is yelling that he hasn't had any sleep because of the beeping. Pt trying to take off heart monitor. Beeping minimized as much as possible. Door closed. Pt was provided with another urinal due to his being full.

## 2025-05-29 ENCOUNTER — OFFICE VISIT (OUTPATIENT)
Dept: FAMILY MEDICINE CLINIC | Age: 65
End: 2025-05-29
Payer: MEDICARE

## 2025-05-29 VITALS
DIASTOLIC BLOOD PRESSURE: 70 MMHG | SYSTOLIC BLOOD PRESSURE: 120 MMHG | HEART RATE: 65 BPM | OXYGEN SATURATION: 98 % | WEIGHT: 130 LBS | BODY MASS INDEX: 20.36 KG/M2

## 2025-05-29 DIAGNOSIS — R07.81 RIB PAIN: ICD-10-CM

## 2025-05-29 DIAGNOSIS — M25.552 LEFT HIP PAIN: ICD-10-CM

## 2025-05-29 DIAGNOSIS — E78.2 MIXED HYPERCHOLESTEROLEMIA AND HYPERTRIGLYCERIDEMIA: Primary | ICD-10-CM

## 2025-05-29 DIAGNOSIS — Z91.81 H/O FALL: ICD-10-CM

## 2025-05-29 DIAGNOSIS — E55.9 HYPOVITAMINOSIS D: ICD-10-CM

## 2025-05-29 PROCEDURE — 1159F MED LIST DOCD IN RCRD: CPT | Performed by: FAMILY MEDICINE

## 2025-05-29 PROCEDURE — 1160F RVW MEDS BY RX/DR IN RCRD: CPT | Performed by: FAMILY MEDICINE

## 2025-05-29 PROCEDURE — 99214 OFFICE O/P EST MOD 30 MIN: CPT | Performed by: FAMILY MEDICINE

## 2025-05-29 PROCEDURE — 1123F ACP DISCUSS/DSCN MKR DOCD: CPT | Performed by: FAMILY MEDICINE

## 2025-05-29 RX ORDER — ERGOCALCIFEROL 1.25 MG/1
50000 CAPSULE, LIQUID FILLED ORAL WEEKLY
Qty: 12 CAPSULE | Refills: 1 | Status: SHIPPED | OUTPATIENT
Start: 2025-05-29

## 2025-05-29 NOTE — PATIENT INSTRUCTIONS
LOW SALT FOR BLOOD PRESSURE CONTROL.  LOW FAT DIET FOR CHOLESTEROL CONTROL.  DRINK ENOUGH FLUIDS FOR BETTER KIDNEY FUNCTION.  TAKE CRESTOR 10 MG. NIGHTLY   FOR CHOLESTEROL CONTROL..  TAKE  VITAMIN D-3 48503 UNITS WEEKLY FOR IMPROVING DEFICIENCY.  DISCUSSED AND ADVISED COMPLIANCE.  REGULAR WALKING ADVISED.  FASTING FOR LAB WORK  ONE MORNING.  NEXT APPOINTMENT IN 1 MONTH FOR ANNUAL PHYSICAL EXAMINATION.

## 2025-05-29 NOTE — PROGRESS NOTES
Stability: Low Risk  (4/1/2025)    Housing Stability Vital Sign     Unable to Pay for Housing in the Last Year: No     Number of Times Moved in the Last Year: 0     Homeless in the Last Year: No       I have reviewed [place] allergies, medications, problem list, medical, social and family history and have updated as needed in the electronic medical record”  Review Of Systems:    Skin: no abnormal pigmentation, rash, scaling, itching, masses, hair or nail changes  Eyes: no blurring, diplopia, or eye pain  Ears/Nose/Throat: no hearing loss, tinnitus, vertigo, nosebleed, nasal congestion, rhinorrhea, sore throat  Respiratory: no cough, pleuritic chest pain, dyspnea, or wheezing  Cardiovascular: no chest pain, angina, dyspnea on exertion, orthopnea, PND, palpitations, or claudication  Gastrointestinal: no nausea, vomiting, heartburn, diarrhea, constipation, bloating,  abdominal pain, or blood per rectum.Appetite is good  Genitourinary: no urinary urgency, frequency, dysuria, nocturia, hesitancy, or incontinence  Musculoskeletal: joint pains off and on. Morning stiffness. Ambulating well  Neurologic: no paralysis, paresis, paresthesia, seizures, tremors, or headaches  Hematologic/Lymphatic/Immunologic: no anemia, abnormal bleeding/bruising, fever, chills, night sweats, swollen glands, or unexplained weight loss  Endocrine: no heat or cold intolerance and no polyphagia, polydipsia, or polyuria        OBJECTIVE:     VS:  Wt Readings from Last 3 Encounters:   05/29/25 59 kg (130 lb)   04/01/25 58.5 kg (129 lb)   07/25/24 56.7 kg (125 lb)     Temp Readings from Last 3 Encounters:   05/04/25 98.4 °F (36.9 °C) (Oral)   02/05/21 97.3 °F (36.3 °C)   11/12/20 98 °F (36.7 °C)     BP Readings from Last 3 Encounters:   05/29/25 120/70   05/04/25 (!) 144/73   04/01/25 120/78        General appearance: Alert, Awake, Oriented times 3, no distress  Skin: Warm and dry  Head: Normocephalic. No masses, lesions or tenderness noted  Eyes:

## 2025-06-16 DIAGNOSIS — M25.552 LEFT HIP PAIN: ICD-10-CM

## 2025-06-16 DIAGNOSIS — R07.81 RIB PAIN: ICD-10-CM

## 2025-06-25 ENCOUNTER — OFFICE VISIT (OUTPATIENT)
Dept: FAMILY MEDICINE CLINIC | Age: 65
End: 2025-06-25
Payer: MEDICARE

## 2025-06-25 VITALS
SYSTOLIC BLOOD PRESSURE: 120 MMHG | HEART RATE: 63 BPM | WEIGHT: 129 LBS | OXYGEN SATURATION: 97 % | DIASTOLIC BLOOD PRESSURE: 70 MMHG | BODY MASS INDEX: 20.2 KG/M2

## 2025-06-25 DIAGNOSIS — R07.81 RIB PAIN: ICD-10-CM

## 2025-06-25 DIAGNOSIS — M25.552 LEFT HIP PAIN: ICD-10-CM

## 2025-06-25 DIAGNOSIS — E55.9 HYPOVITAMINOSIS D: ICD-10-CM

## 2025-06-25 DIAGNOSIS — E78.2 MIXED HYPERCHOLESTEROLEMIA AND HYPERTRIGLYCERIDEMIA: Primary | ICD-10-CM

## 2025-06-25 PROCEDURE — 1160F RVW MEDS BY RX/DR IN RCRD: CPT | Performed by: FAMILY MEDICINE

## 2025-06-25 PROCEDURE — 1159F MED LIST DOCD IN RCRD: CPT | Performed by: FAMILY MEDICINE

## 2025-06-25 PROCEDURE — 1123F ACP DISCUSS/DSCN MKR DOCD: CPT | Performed by: FAMILY MEDICINE

## 2025-06-25 PROCEDURE — 99214 OFFICE O/P EST MOD 30 MIN: CPT | Performed by: FAMILY MEDICINE

## 2025-06-25 NOTE — PROGRESS NOTES
OFFICE PROGRESS NOTE      SUBJECTIVE:        Patient ID:   Leighton Schilling is a 64 y.o. male who presents for   Chief Complaint   Patient presents with    Results     xrays           HPI:     RECHECK BP, CHOLESTEROL AND HYPOVITAMINOSIS D.  STILL HAS SOME RIB PAIN ON THE LEFT SIDE BUT GETTING BETTER SLOWLY. NO HIP AREA PAIN NOW.  FORGOT TO GET BLOOD TEST DONE AS ORDERED LAST VISIT.  MEDICATION REFILL.  FEELS GOOD.   WATCHING DIET GOOD.  WALKING FOR EXERCISE.  TAKING MEDICATIONS REGULARLY.         Prior to Admission medications    Medication Sig Start Date End Date Taking? Authorizing Provider   vitamin D (ERGOCALCIFEROL) 1.25 MG (05049 UT) CAPS capsule Take 1 capsule by mouth once a week 25  Yes Parish Shine MD   rosuvastatin (CRESTOR) 10 MG tablet Take 1 tablet by mouth nightly 25  Yes Parish Shine MD     Social History     Socioeconomic History    Marital status: Single     Spouse name: None    Number of children: None    Years of education: None    Highest education level: None   Tobacco Use    Smoking status: Former     Current packs/day: 0.00     Types: Cigarettes     Quit date: 2004     Years since quittin.9    Smokeless tobacco: Never   Substance and Sexual Activity    Alcohol use: No     Alcohol/week: 0.0 standard drinks of alcohol    Drug use: No    Sexual activity: Never     Social Drivers of Health     Financial Resource Strain: Low Risk  (2024)    Overall Financial Resource Strain (CARDIA)     Difficulty of Paying Living Expenses: Not hard at all   Food Insecurity: No Food Insecurity (2025)    Hunger Vital Sign     Worried About Running Out of Food in the Last Year: Never true     Ran Out of Food in the Last Year: Never true   Transportation Needs: No Transportation Needs (2025)    PRAPARE - Transportation     Lack of Transportation (Medical): No     Lack of Transportation (Non-Medical): No   Housing Stability: Low Risk

## 2025-06-25 NOTE — PATIENT INSTRUCTIONS
LOW SALT FOR BLOOD PRESSURE CONTROL.  LOW FAT DIET FOR CHOLESTEROL CONTROL.  DRINK ENOUGH FLUIDS FOR BETTER KIDNEY FUNCTION.  TAKE CRESTOR 10 MG. NIGHTLY   FOR CHOLESTEROL CONTROL..  TAKE  VITAMIN D-3 06682 UNITS WEEKLY FOR IMPROVING DEFICIENCY.  DISCUSSED AND ADVISED COMPLIANCE.  REGULAR WALKING ADVISED.  FASTING FOR LAB WORK  ONE MORNING.  NEXT APPOINTMENT IN 2 MONTH FOR ANNUAL PHYSICAL EXAMINATION.

## 2025-06-26 DIAGNOSIS — E78.2 MIXED HYPERCHOLESTEROLEMIA AND HYPERTRIGLYCERIDEMIA: ICD-10-CM

## 2025-06-26 LAB
ALBUMIN: 4 G/DL (ref 3.5–5.2)
ALP BLD-CCNC: 66 U/L (ref 40–129)
ALT SERPL-CCNC: 35 U/L (ref 0–50)
ANION GAP SERPL CALCULATED.3IONS-SCNC: 11 MMOL/L (ref 7–16)
AST SERPL-CCNC: 45 U/L (ref 0–50)
BILIRUB SERPL-MCNC: 0.4 MG/DL (ref 0–1.2)
BUN BLDV-MCNC: 14 MG/DL (ref 8–23)
CALCIUM SERPL-MCNC: 9 MG/DL (ref 8.8–10.2)
CHLORIDE BLD-SCNC: 102 MMOL/L (ref 98–107)
CHOLESTEROL, TOTAL: 163 MG/DL
CO2: 25 MMOL/L (ref 22–29)
CREAT SERPL-MCNC: 0.9 MG/DL (ref 0.7–1.2)
GFR, ESTIMATED: >90 ML/MIN/1.73M2
GLUCOSE BLD-MCNC: 67 MG/DL (ref 74–99)
HDLC SERPL-MCNC: 73 MG/DL
LDL CHOLESTEROL: 74 MG/DL
POTASSIUM SERPL-SCNC: 3.7 MMOL/L (ref 3.5–5.1)
SODIUM BLD-SCNC: 138 MMOL/L (ref 136–145)
TOTAL PROTEIN: 7 G/DL (ref 6.4–8.3)
TRIGL SERPL-MCNC: 79 MG/DL
VLDLC SERPL CALC-MCNC: 16 MG/DL

## 2025-07-01 ENCOUNTER — RESULTS FOLLOW-UP (OUTPATIENT)
Dept: FAMILY MEDICINE CLINIC | Age: 65
End: 2025-07-01

## 2025-08-05 RX ORDER — ROSUVASTATIN CALCIUM 10 MG/1
10 TABLET, COATED ORAL NIGHTLY
Qty: 90 TABLET | Refills: 1 | Status: SHIPPED | OUTPATIENT
Start: 2025-08-05

## 2025-08-22 ENCOUNTER — TELEPHONE (OUTPATIENT)
Dept: FAMILY MEDICINE CLINIC | Age: 65
End: 2025-08-22